# Patient Record
Sex: FEMALE | Race: BLACK OR AFRICAN AMERICAN
[De-identification: names, ages, dates, MRNs, and addresses within clinical notes are randomized per-mention and may not be internally consistent; named-entity substitution may affect disease eponyms.]

---

## 2020-06-24 ENCOUNTER — HOSPITAL ENCOUNTER (EMERGENCY)
Dept: HOSPITAL 74 - JER | Age: 20
Discharge: HOME | End: 2020-06-24
Payer: COMMERCIAL

## 2020-06-24 VITALS — TEMPERATURE: 98.7 F | SYSTOLIC BLOOD PRESSURE: 130 MMHG | DIASTOLIC BLOOD PRESSURE: 70 MMHG | HEART RATE: 74 BPM

## 2020-06-24 VITALS — BODY MASS INDEX: 27.3 KG/M2

## 2020-06-24 DIAGNOSIS — M25.551: Primary | ICD-10-CM

## 2020-06-24 PROCEDURE — 3E023GC INTRODUCTION OF OTHER THERAPEUTIC SUBSTANCE INTO MUSCLE, PERCUTANEOUS APPROACH: ICD-10-PCS

## 2020-06-24 NOTE — PDOC
History of Present Illness





- General


Chief Complaint: Pain, Acute


Stated Complaint: HIP PAIN


Time Seen by Provider: 20 22:37


History Source: Patient


Exam Limitations: No Limitations





- History of Present Illness


Initial Comments: 





20 22:47


19-year-old female no significant past medical history presents complaining of 

right hip pain since 2 PM today.  Patient states that the pain came on out of 

nowhere feels like a pulled muscle in her hip.  She states that she has had this

pain before and treated it with ibuprofen and a muscle relaxer.  She states that

this pain came on after she had a  section in October and has persisted 

since worsening today.  At that at that time she told the doctors that she had 

right hip pain, but states nothing ever came of that.  Patient presents today 

stating that she has difficulty walking secondary to the pain difficulty with 

hip flexion and extension as well as AB and adduction.  Pt otherwise denies: 

fevers, chills, syncope, lightheadedness, dizziness, headaches, neck pain, chest

pain, shortness of breath, palpitations, back pain, abdominal pain, nausea, 

vomiting, diarrhea, constipation, melena, vaginal bleeding, vaginal discharge. 

Denies DVT risk factors


20 22:53








Past History





- Medical History


Allergies/Adverse Reactions: 


                                    Allergies











Allergy/AdvReac Type Severity Reaction Status Date / Time


 


shellfish derived Allergy   Verified 20 22:36











Home Medications: 


Ambulatory Orders





Ibuprofen [Ibu] 600 mg PO TID 7 Days #21 tablet 20 


Methocarbamol [Robaxin -] 500 mg PO BID #14 tablet 20 











- Psycho-Social/Smoking History


Smoking History: Never smoked





- Substance Abuse Hx (Audit-C & DAST Scrn)


How often the patient has a drink containing alcohol: Never


Score: In Men: 4 or > Positive; In Women: 3 or > Positive: 0


Screen Result (Pos requires Nsg. Audit-10AR): Negative


In the last yr the pt used illegal drug/Rx for NonMed reason: Yes


Score:  Yes response is considered Positive: 1


Screen Result (Positive result requires Nsg. DAST-10): Positive





**Review of Systems





- Review of Systems


Constitutional: No: Chills, Fever


Respiratory: No: Cough, Shortness of Breath


Cardiac (ROS): No: Chest Pain


ABD/GI: No: Abdominal Distended, Diarrhea, Nausea, Vomiting


: No: Dysuria, Discharge


Musculoskeletal: Yes: Joint Pain (hip pain), Muscle Pain.  No: Back Pain, Joint 

Swelling, Muscle Weakness


Neurological: No: Headache, Numbness, Paresthesia





*Physical Exam





- Vital Signs


                                Last Vital Signs











Temp Pulse Resp BP Pulse Ox


 


 98.7 F   74   20   130/70   99 


 


 20 22:32  20 22:32  20 22:32  20 22:32  20 22:32














- Physical Exam





20 22:51


Gen: AAOx 3, no acute distress, comfortable, no signs of respiratory distress 


HENT: atraumatic, normocephalic with no laceration or contusion. Nasal mucosa 

without erythema. Oropharynx without erythema or exudates. Mucous membranes 

moist. 


EYES: PERRL, EOM intact, conjunctiva pink 


NECK: supple; trachea midline; no JVD, no lymphadenopathy, or thyromegaly


CV: RRR no murmurs, gallops, or rubs.


CHEST: CTA b/l no wheezing, rales or rhonchi


ABD: +BS/ND. no TTP; soft, no rebound, no guarding


EXTREMITY: no cyanosis or erythema. 2+ dorsalis pedis, posterior tibial, and 

radial pulse. No pedal edema; no calf swelling or tenderness 


SKIN: no rash, warm and dry, no diaphoresis 


HEME: no purpura or ecchymosis


NEURO: normal speech, CN II-XII intact, sensation intact, normal gait, no 

cerebellar deficits


MS: 5/5 strength in all extremities, FROM intact in all extremities. Except Righ

t hip


Right hip:  ttp over anterior hip flexor with reproducible pain on hip flexion 

extension, AB/ADDuction.  2 + femoral pulse, no signs of femoral hernia, pt 

walking with altered gait 2/2 pain


20 23:57








ED Treatment Course





- RADIOLOGY


Radiology Studies Ordered: 














 Category Date Time Status


 


 HIP & PELVIS-RIGHT [RAD] Stat Radiology  20 22:38 Ordered














Medical Decision Making





- Medical Decision Making





20 22


This is a 19-year-old female with no significant past medical history 

complaining of right hip pain most likely musculoskeletal in nature.  





All vital signs stable





Plan:


Will obtain x-ray of hip and pelvis


Toradol Flexeril for symptomatic relief


Urine hCG prior to x-ray and medicine.  


Will reassess based on results





Patient states pain has improved in the ED x-ray negative for any acute 

findings.  Robaxin and ibuprofen sent to patient's preferred pharmacy.   Patient

 was instructed not to drive or operate heavy machinery while taking Robaxin.  

The patient was also instructed not to take the medication with any other 

sedating medications and not to drink alcohol while taking the medication.





Patient is safe and stable for discharge.


Appears well ambulated in ED.


Supportive care instructions explained and given to pt.  Reasons to return 

emergently to ER explained and given. Importance of follow up with PMD and other

 specialists as indicated stressed to pt. Pt verbalized understanding of 

instructions.  Pt to follow up with PMD in 2 days.








Discharge





- Discharge Information


Problems reviewed: Yes


Clinical Impression/Diagnosis: 


 Hip pain, right





Condition: Stable


Disposition: HOME





- Admission


No





- Additional Discharge Information


Prescriptions: 


Ibuprofen [Ibu] 600 mg PO TID 7 Days #21 tablet


Methocarbamol [Robaxin -] 500 mg PO BID #14 tablet





- Follow up/Referral





- Patient Discharge Instructions


Patient Printed Discharge Instructions:  DI for Hip Pain





- Post Discharge Activity


Work/Back to School Note:  Back to Work

## 2020-09-15 ENCOUNTER — HOSPITAL ENCOUNTER (EMERGENCY)
Dept: HOSPITAL 74 - JER | Age: 20
Discharge: HOME | End: 2020-09-15
Payer: COMMERCIAL

## 2020-09-15 VITALS — TEMPERATURE: 98.2 F | DIASTOLIC BLOOD PRESSURE: 73 MMHG | HEART RATE: 100 BPM | SYSTOLIC BLOOD PRESSURE: 117 MMHG

## 2020-09-15 VITALS — BODY MASS INDEX: 25 KG/M2

## 2020-09-15 DIAGNOSIS — S93.402A: Primary | ICD-10-CM
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
## 2020-09-15 NOTE — XMS
Summarization Of Episode

                          Created on:September 15, 2020



Patient:NILES GU

Sex:Female

:2000

External Reference #:[id number]





Demographics







                          Address                   [address] [address]

 

                          Home Phone                [phone]

 

                          Mobile Phone              [phone]

 

                          Work Phone                [phone]

 

                          Email Address             [email or web address]

 

                          Preferred Language        English

 

                          Marital Status            Not  or 

 

                          Quaker Affiliation     NO

 

                          Race                      BL

 

                          Ethnic Group              Not  or 









Author







                          Organization              [facility]









Support







                Name            Relationship    Address         Phone

 

                UE, UNEMPLOYED  Unavailable     Unavailable     Unavailable

 

                UE              Unavailable     Unavailable     Unavailable

 

                UNEMPLOYD       Unavailable     Unavailable     Unavailable

 

                UNKNOWN         Unavailable     Unavailable     Unavailable

 

                COGKASSANDRA TEJADARN PARTNER         [address] [phone]



                                                [place] [address] 

 

                JOEY GRAVES SISTER          [address] [phone]



                                                [place] [address] 

 

                COGMALLORY, NATHANHORN Unavailable     Unavailable     Unavailable

 

                JOEY GRAVES Sister          [address]availa

ble



                                                [place] [address] 

 

                brina, wade  Unavailable     [address]     Unavailable



                                                [place] [address] 









Care Team Providers







                    Name                Role                Phone

 

                    MONIKA, LESLIE MD   Unavailable         Unavailable

 

                    MONIKA, LESLIE MD   Unavailable         Unavailable

 

                    MONIKA, LESLIE MD   Unavailable         Unavailable

 

                    MONIKA, LESLIE MD   Unavailable         Unavailable

 

                    MONIKA, LESLIE MD   Unavailable         Unavailable

 

                    MONIKA, LESLIE MD   Unavailable         Unavailable

 

                    MONIKA, LESLIE MD   Unavailable         Unavailable

 

                    MONIKA, LESLIE MD   Unavailable         Unavailable

 

                    MONIKA, LESLIE MD   Unavailable         Unavailable

 

                    MONIKA, LESLIE MD   Unavailable         Unavailable

 

                    MONIKA, LESLIE MD   Unavailable         Unavailable

 

                    MONIKA, LESLIE MD   Unavailable         Unavailable

 

                    MONIKA, LESLIE MD   Unavailable         Unavailable

 

                    MONIKA, LESLIE MD   Unavailable         Unavailable

 

                    MONIKA, LESLIE MD   Unavailable         Unavailable

 

                    MONIKA, LESLIE MD   Unavailable         Unavailable

 

                    MONIKA, LESLIE MD   Unavailable         Unavailable

 

                    MONIKA, LESLIE MD   Unavailable         Unavailable

 

                    MONIKA, LESLIE MD   Unavailable         Unavailable

 

                    MONIKA, LESLIE MD   Unavailable         Unavailable

 

                    MONIKA, LESLIE MD   Unavailable         Unavailable

 

                    MONIKA, LESLIE MD   Unavailable         Unavailable

 

                    MONIKA, LESLIE MD   Unavailable         Unavailable

 

                    MONIKA, LESLIE MD   Unavailable         Unavailable

 

                    MONIKA, LESLIE MD   Unavailable         Unavailable

 

                    MONIKA, LESLIE MD   Unavailable         Unavailable

 

                    LESLIE FRANK MD   Unavailable         Unavailable

 

                    MD Rod Kristin Unavailable         Unavailable

 

                    HHCCC, CNR9         Unavailable         Unavailable

 

                    Orquidea Birch Unavailable         Unavailable

 

                    Meka Kimball MD Unavailable         Unavailable

 

                    JULIET Kimball MD     Unavailable         Unavailable

 

                    JULIET Kimball MD     Unavailable         Unavailable

 

                    JULIET Kimball MD     Unavailable         Unavailable

 

                    JULIET Kimball MD     Unavailable         Unavailable

 

                    JULIET Kimball MD     Unavailable         Unavailable

 

                    STAFF,  NOT ON      Unavailable         Unavailable

 

                    Joseph             Unavailable         Unavailable

 

                    Chandan,  MD        Unavailable         Unavailable

 

                    Chandan,  MD        Unavailable         Unavailable

 

                    Chandan,  MD        Unavailable         Unavailable

 

                    Chandan,  MD        Unavailable         Unavailable

 

                    Chandan,  MD        Unavailable         Unavailable

 

                    Chandan,  MD        Unavailable         Unavailable

 

                    Chandan,  MD        Unavailable         Unavailable

 

                    Chandan,  MD        Unavailable         Unavailable

 

                    Chandan,  MD        Unavailable         Unavailable

 

                    Chandan,  MD        Unavailable         Unavailable

 

                    Chandan,  MD        Unavailable         Unavailable

 

                    Cange,  NP          Unavailable         Unavailable









Re-disclosure Warning

The records that you are about to access may contain information from federally-
assisted alcohol or drug abuse programs. If such information is present, then 
the following federally mandated warning applies: This information has been 
disclosed to you from records protected by federal confidentiality rules (42 CFR
part 2). The federal rules prohibit you from making any further disclosure of 
this information unless further disclosure is expressly permitted by the written
consent of the person to whom it pertains or as otherwise permitted by 42 CFR 
part 2. A general authorization for the release of medical or other information 
is NOT sufficient for this purpose. The Federal rules restrict any use of the 
information to criminally investigate or prosecute any alcohol or drug abuse 
patient.The records that you are about to access may contain highly sensitive 
health information, the redisclosure of which is protected by Article 27-F of 
the [facility] Public Health law. If you continue you may haveaccess to 
information: Regarding HIV / AIDS; Provided by facilities licensed or operated 
by the [facility] Office of Mental Health; or Provided by the [facility]
Office for People With Developmental Disabilities. If such information is 
present, then the following [place] mandated warning applies: This 
information has been disclosed to you from confidential records which are 
protected by state law. State law prohibits you from making any further 
disclosure of this information without the specific written consent of the 
person to whom it pertains, or as otherwise permitted by law. Any unauthorized 
further disclosure in violation of state law may result in a fine or intermediate 
sentence or both. A general authorization for the release of medical or other 
information is NOT sufficient authorization for further disclosure.



Allergies and Adverse Reactions







           Type       Description Substance  Reaction   Status     Data Source(s

)

 

                      SHELLFISH  SHELLFISH  FACE SWELLING-I            [facility]

ins



                                            [place]                    Hospital

 

           Drug allergy No Known   No Known   UNKNOWN               [facility]



                      Allergies  Allergies                        Hospital

 

                      seafood    seafood    unknown               [facility]

 

           Allergy to No Known   No known                         [facility] ([facility]

t



           substance  Allergies  allergies                        [facility]



                                 (situation)                       [facility])

 

           [id number]  SHELLFISH  Synonym(s): Idiopathic Active     [facility]

alth



                                 SHELLFISH  urticaria             System









                                        Itching in throat









           No Known Allergies No Known Allergies No known allergies             

          eCW2 (Planned



                                 (situation)                       [facility]



                                                                  [employer]

orated)

 

           No Known Allergies No Known Allergies No known allergies             

          eCW2 (Planned



                                 (situation)                       [facility]



                                                                  [employer]

orated)

 

           No Known Allergies No Known Allergies No known allergies             

          eCW2 (Planned



                                 (situation)                       [facility]



                                                                  [employer]

orated)







Encounters







           Encounter  Providers  Location   Date       Indications Data Source(s

)

 

           Outpatient Attender: CNR9 [facility]            2020            [facility] 

([facility]



                                            11:46:02 AM            [facility])









                                        Patient admitted.









           Outpatient Attender: CN9 [facility]            2020 06:31:14 AM    

        [facility] ([facility])









                                        Patient admitted.









           Outpatient Attender: CN9 [facility]            2020 02:42:34 PM    

        [facility] ([facility])









                                        Patient admitted.









           Outpatient Attender: CN9 [facility]            2019 10:12:04 AM    

        [facility] ([facility])









                                        Patient admitted.









           Outpatient Attender: Coleman            10/25/2019 12:51:00 ABDOMEN W

OUND [facility]



                      Cange NP              PM EDT                Hospital









                                        ABDOMEN WOUND









           Outpatient Attender: Coleman            10/17/2019 03:37:00 ABD WOUND

  [facility]



                      Cange NPAttender:            PM EDT                Hospita

l



                      DOCTOR STAFF                                  









                                        ABD WOUND









           Outpatient Attender: MD ICU-ANTE PAR 10/16/2019 08:56:45            M

[facility] - Richard Velasco            AM EDT                Hosp

ital









                                        Admission cancelled. Disregard status an

d admitted date.









           Inpatient  Attender: LESLIE FRANK            10/11/2019 PREGNANCY  W

jc [place]



                      MDAttender: Orquidea            03:15:00 PM EDT -         

   [facility]            10/16/2019       

     



                      [facility]            03:20:00 PM EDT            









                                        PREGNANCY

 

                                        Patient discharged.









           Outpatient Attender: Sofiya CURTIS 10/09/2019 10:11:09           

 [facility]



                      VielmanAttender: MD MUÑOZ EDT - 10/10/2019        

    [facility]            11:59:00 PM EDT           

 









                                        Patient discharged.









           Outpatient Attender: Meka CURTIS 10/04/2019 09:25:45            

[facility] [place]



                      Jigar MDAttender:            WANDA EDT - 10/04/2019       

     [facility]



                      MD Foster            01:45:00 PM EDT            



                      Rod                                   









                                        Patient discharged.









           Outpatient Attender: MD CURTIS 2019 10:58:13            CHARIS[facility]

  Richard Velasco             EDT - 2019       

     [facility]



                                            12:23:00 PM EDT            









                                        Patient discharged.









           Unlisted                         2019            [employer] ([facility]

al



           evaluation and                       12:00:00 PM EDT            [facility]

 Association



           management service                                             of [facility])

 

           Unlisted                         2019            [employer] ([facility]

al



           evaluation and                       06:50:00 PM EDT -            [facility]

[facility]



           management service                       2019            of [facility])



                                            05:45:00 PM EDT            

 

           Outpatient Attender: MD KOHLI   2019            [facility]

ad[place]



                      Bartholome PAR        01:06:17 PM EDT -            [facility]

chad Velasco             2019            



                                            11:59:00 PM EDT            









                                        Patient discharged.









           Outpatient Attender: MD KAMILLA CORDERO 06/10/2019 10:02:00            [facility] Richard Velasco             EDT - 06/10/2019       

     [facility]



                                            11:59:00 PM EDT            









                                        Patient discharged.









           [facility]          

  eCW2 ([facility]



           Richard Avendaño 12:00:00 AM EDT            [facility]

[facility]



                                                                  [employer]



                                                                  Incorporated)

 

           [facility]          

  eCW2 (Planned



           Jeevan Avendaño 12:00:00 AM EDT            [facility]

[facility]



                                                                  [facility]



                                                                  Incorporated)

 

           Outpatient<td Attender:  Jeevan Avendaño 2019            [facility] 

([facility]



           ID="encounterTypeD Anhtho     Neighborhood 05:00:00 PM EDT           

 [place]



           escriptionID0">[facility] - [facility]            N

eighborhood



           [facility]</td><td>Critical access hospital                       11:59:00 PM EDT            

[facility]



           BOB [facility]WLEY MD</td><td>[facility]</td><td></td><td></                                             



           td>                                                    

 

           [facility]          

  eCW2 (Planned



           [place]               Parachute 12:00:00 AM EST            [facility]

[facility] -



                                                                  [facility]



                                                                  Incorporated)

 

           [facility] 2018          

  eCW2 (Planned



           [place]               Parachute 12:00:00 AM EST            [facility]

[facility] -



                                                                  [facility]



                                                                  Incorporated)

 

           [facility]            [facility] 10/24[facility]2017          

  eCW2 (Planned



           [place]               Parachute 12:00:00 AM EDT            [facility]

[facility] -



                                                                  [facility]



                                                                  Incorporated)

 

           [facility] 2017          

  eCW2 (Planned



           [place]               Parachute 12:00:00 AM EDT            [facility]

[facility] -



                                                                  [facility]



                                                                  Incorporated)

 

           [facility]            [facility]          

  eCW2 (Planned



           [place]               Parachute 12:00:00 AM EDT            [facility]

[facility] -



                                                                  [facility]



                                                                  Incorporated)

 

           [facility]            [facility]          

  eCW2 (Planned



           [place]               Parachute 12:00:00 AM EDT            [facility]

[facility] -



                                                                  [facility]



                                                                  Incorporated)

 

           [facility]            [facility]          

  eCW2 (Planned



           [place]               Parachute 12:00:00 AM EDT            [facility]

[facility] -



                                                                  [facility]



                                                                  Incorporated)

 

           [facility]            [facility]          

  eCW2 (Planned



           [place]               Parachute 12:00:00 AM EDT            [facility]

[facility] -



                                                                  [facility]



                                                                  Incorporated)







Immunizations







             Vaccine      Date         Status       Description  Data Source(s)

 

             No Known Immunizations              completed                 eCW2 

([facility]



                                                                 [employer]

rated)

 

             No Known Immunizations              completed                 eCW2 

([facility]



                                                                 [employer]

rated)

 

             No Known Immunizations              completed                 eCW2 

([facility]



                                                                 [employer]

rated)







Medications







       Medication Brand  Start  Product Dose   Route  Administrative Pharmacy [facility] 

Indications         Reaction            Description         Data



          Name Date Form           Instructions Instructions                    

 Source(s)

 

     Acetaminoph Oxycod 10/16/ TABLET 1    ORAL           complet               

 [facility]



     en 325 MG / one        {Caps                ed                  [facility]



     Oxycodone Hcl/Ac 09:26:      ule}                                    Hospit

al



     Hydrochlori etamin 00 AM                                              



     de 5 MG ophen EDT                                               



     Oral Tablet                                                        



     Oxycodone                                                        



     Hcl/Acetami                                                        



     nophen                                                        

 

     Ibuprofen Ibupro 10/16/ TABLET 600  ORAL           complet                W

jc



     600 MG Oral fen  2019      mg                  ed                  [facility]



     Tablet      09:26:                                              Hospital



               00 AM                                              



               EDT                                               

 

     Ibuprofen Ibupro 10/16/ TABLET 600  ORAL           complet                W

jc



     600 MG Oral fen  2019      mg                  ed                  [facility]



     Tablet      09:26:                                              Hospital



               00 AM                                              



               EDT                                               

 

     Acetaminoph Oxycod 10/16/ TABLET 1    ORAL           complet               

 [facility]



     en 325 MG / one        {Caps                ed                  [facility]



     Oxycodone Hcl/Ac 09:26:      ule}                                    Hospit

al



     Hydrochlori etamin 00 AM                                              



     de 5 MG ophen EDT                                               



     Oral Tablet                                                        



     Oxycodone                                                        



     Hcl/Acetami                                                        



     nophen                                                        

 

     Prenatal Prenat / UNIT                     active           Prenatal G

REENWAY



     Vitamins al                                            Vitamins ([facility]



     28-0.8MG Vitami 12:00:                                              [facility]



     Oral Tablet ns   00 AM                                              [facility]

rho



          28-0.8 EDT                                               [facility]



                                                               Center)



          Oral                                                   



          Tablet                                                   

 

     Unknown                                    complet                eCW2



     Medications                                    ed                  ([facility]



                                                                 [facility]



                                                                 [facility]



                                                                 [facility]



                                                                 Incorporat



                                                                 ed)

 

     Pnv            TABLET 1    ORAL           complet                [facility]



     Cmb#21/Iron                {Caps                ed                  [facility]



     /Folic                ule}                                    Hospital



     Acid*                                                        

 

     Pnv            TABLET 1    ORAL           complet                [facility]



     Cmb#21/Iron                {Caps                ed                  [facility]



     /Folic                ule}                                    Hospital



     Acid*                                                        

 

     Etonogestre Nexpla                               active           as direct

ed eCW2



     l 68 MG non 68                                                   ([facility]



     Drug MG                                                     [facility]



     Implant                                                        - [facility]



     [Nexplanon]                                                        [facility]



     Nexplanon                                                        Incorporat



     68 MG                                                        ed)

 

     Etonogestre Nexpla                               active           as direct

ed eCW2



     l 68 MG non 68                                                   (Planned



     Drug MG                                                     [facility]



     Implant                                                        - Reed



     [Nexplanon]                                                        Shaw



     Nexplanon                                                        Incorporat



     68 MG                                                        ed)







Insurance Providers







          Payer name Policy type / Policy ID Covered   Covered party's Policy   

 Plan



                    Coverage type           party ID  relationship to Cutler    

Information



                                                  cutler              

 

          [facility] MEDICAID           [id number]                             [id number]

[id number]



          HMO                                                         

 

          SELF PAY                                SP                  



          INSURANCE                                                   

 

          [facility] Medicaid Medicaid  [id number]           1                   [id number]

[id number]

 

          [place]              [id number]           PT                  [id number]

[phone]



          [facility]  Medicaid  [id number]           1                   [id number]

[id number]



          Health Plan                                                   

 

          Medicaid            [id number]            S                   [id number]



          [id number] Regular                                                   



          Clinic Visit                                                   

 

          [facility]           [id number]           S                   [phone]



          Managed Care                                                   

 

          [facility]  Individual 0                   Self                0



          Health Plan Policy                                            







Problems, Conditions, and Diagnoses







           Code       Display Name Description Problem Type Effective  Data Sour

ce(s)



                                                       Dates      

 

             Anxiety disorder Anxiety disorder Complaint  01/10/2020 [place]

[facility]



                      (disorder) (disorder)            05:00:00 AM ([facility]

h



                                                       [facility])

 

           [id number]  Housing Problem Housing Problem ( Complaint  01/10/2020 [place]

[facility]



                      ( finding ) finding )             05:00:00 AM ([facility])

 

           [id number]  Anxiety disorder Anxiety disorder Complaint  08/15/2019 [place]

[facility]



                                                       12:00:00 PM ([facility]





                                                       EDT        [facility])

 

           [id number] Hernia Repair, Hernia Repair, Problem    2019 [facility]

[place] ([place]



                      Umbilical  Umbilical             12:00:00 AM [place]



                                                       EDT -      Neighborhood



                                                       2019 [facility] [place])



                                                       12:00:00 AM 



                                                       EDT        

 

                      Unknown Problems Unknown Problems Problem               eC

W2 (Planned



                                                                  [facility]



                                                                  [facility]



                                                                  Incorporated)

 

                      Unknown Problems Unknown Problems Problem               eC

W2 (Planned



                                                                  [facility]



                                                                  [facility]



                                                                  Incorporated)

 

                      Unknown Problems Unknown Problems Problem               eC

W2 (Planned



                                                                  [facility] -



                                                                  [facility]



                                                                  Incorporated)

 

           O69.81X0   Labor and  O69.81X0   Diagnosis  10/11/2019 [facility]



                      delivery                         07:25:00 PM Hospital



                      complicated by                       EDT        



                      cord around                                  



                      neck, without                                  



                      compression, not                                  



                      applicable or                                  



                      unspecified                                  

 

           O42.92     Full-term  O42.92     Diagnosis  10/11/2019 [facility]



                      premature                        07:25:00 PM Hospital



                      rupture of                       EDT        



                      membranes,                                  



                      unspecified as                                  



                      to length of                                  



                      time between                                  



                      rupture and                                  



                      onset of labor                                  

 

           Z37.0      Single live Z37.0      Diagnosis  10/11/2019 [facility]



                      birth                            07:25:00 PM Hospital



                                                       EDT        

 

           Z3A.38     38 weeks   Z3A.38     Diagnosis  10/11/2019 [facility]



                      gestation of                       07:25:00 PM Hospital



                      pregnancy                        EDT        

 

           O99.824    Streptococcus B O99.824    Diagnosis  10/11/2019 [facility] [place]

ins



                      carrier state                       07:25:00 PM Hospital



                      complicating                       EDT        



                      childbirth                                  

 

           O76        Abnormality in O76        Diagnosis  10/11/2019 [facility]

ns



                      fetal heart rate                       07:25:00 PM Hospita

l



                      and rhythm                       EDT        



                      complicating                                  



                      labor and                                   



                      delivery                                    

 

           O36.93X0   Maternal care Fetal and placental Diagnosis  10/10/2019 

S - New



                      for fetal  problem affecting            11:30:00 AM [place]

le



                      problem,   management of            EDT        Hospital



                      unspecified, mother in third                       



                      third trimester, trimester                        



                      not applicable                                  



                      or unspecified                                  

 

           Z3A.38     38 weeks   38 weeks gestation Diagnosis  10/10/2019 S - 

New



                      gestation of of pregnancy            11:30:00 AM [place]



                      pregnancy                        EDT        Hospital

 

           O35.8XX0   Maternal care Abnormal fetal Diagnosis  10/04/2019 S - N

ew



                      for other  echocardiography            11:31:00 AM [facility]

e



                      (suspected) affecting             EDT        Hospital



                      fetal      antepartum care of                       



                      abnormality and mother                           



                      damage, not                                  



                      applicable or                                  



                      unspecified                                  

 

           Z36.0      Encounter for Encounter for Diagnosis  10/04/2019 [facility]

w



                        chorionic villus            11:31:00 AM [facility]

e



                      screening for sampling              EDT        Hospital



                      chromosomal                                  



                      anomalies                                   

 

           Z3A.35     35 weeks   35 weeks gestation Diagnosis  2019 S - 

New



                      gestation of of pregnancy            10:05:00 AM [place]



                      pregnancy                        EDT        Hospital

 

           O35.0XX0   Maternal care Agenesis of corpus Diagnosis  06/10/2019 S

 - New



                      for (suspected) callosum of fetus,            10:02:00 AM 

[facility]



                      central nervous affecting             EDT        Hospital



                      system     antepartum care of                       



                      malformation in mother, not                       



                      fetus, not applicable or                       



                      applicable or unspeci                          



                      unspecified                                  







Surgeries/Procedures







             Procedure    Description  Date         Indications  Data Source(s)

 

             Oxygen therapy              10/12/2019                [facility]



             (procedure)               12:00:00 AM               



                                       EDT                       

 

             Oxygen therapy              10/12/2019                [facility]



             (procedure)               12:00:00 AM               



                                       EDT                       

 

             Oxygen Mask               10/11/2019                [facility]

spital



                                       12:00:00 AM               



                                       EDT                       

 

             Rr 180-240 Min. L+D              10/11/2019                [facility]



                                       12:00:00 AM               



                                       EDT                       

 

             Or 60-75 Min. L+D              10/11/2019                [facility]



                                       12:00:00 AM               



                                       EDT                       

 

             Labor Room                10/11/2019                [facility]

spital



                                       12:00:00 AM               



                                       EDT                       

 

             Resuscitation Bag Inf              10/11/2019                [facility]



                                       12:00:00 AM               



                                       EDT                       

 

             Nasal Cannula              10/11/2019                [facility]

ospital



                                       12:00:00 AM               



                                       EDT                       

 

             External fetal monitor              10/11/2019                [facility]



             surveillance              12:00:00 AM               



             (regime/therapy)              EDT                       

 

              Packs              10/11/2019                [facility]



                                       12:00:00 AM               



                                       EDT                       

 

             Bhandari Cath Kit              10/11/2019                [facility]



                                       12:00:00 AM               



                                       EDT                       

 

             Caesarean Section (Room)              10/11/2019                [facility]



                                       12:00:00 AM               



                                       EDT                       

 

             Straight urinary              10/11/2019                [facility]



             catheterization              12:00:00 AM               



                                       EDT                       

 

             Anesthesia-Epidural              10/11/2019                [facility]



                                       12:00:00 AM               



                                       EDT                       

 

             Oxygen therapy              10/11/2019                [facility]



             (procedure)               12:00:00 AM               



                                       EDT                       

 

             Oxygen Mask               10/11/2019                [facility]

spital



                                       12:00:00 AM               



                                       EDT                       

 

             Rr 180-240 Min. L+D              10/11/2019                [facility]



                                       12:00:00 AM               



                                       EDT                       

 

             Or 60-75 Min. L+D              10/11/2019                [facility]



                                       12:00:00 AM               



                                       EDT                       

 

             Labor Room                10/11/2019                [facility]

spital



                                       12:00:00 AM               



                                       EDT                       

 

             Resuscitation Bag Inf              10/11/2019                [facility]



                                       12:00:00 AM               



                                       EDT                       

 

             Nasal Cannula              10/11/2019                [facility]

ospital



                                       12:00:00 AM               



                                       EDT                       

 

             External fetal monitor              10/11/2019                [facility]



             surveillance              12:00:00 AM               



             (regime/therapy)              EDT                       

 

              Packs              10/11/2019                [facility]



                                       12:00:00 AM               



                                       EDT                       

 

             Bhandari Cath Kit              10/11/2019                [facility]



                                       12:00:00 AM               



                                       EDT                       

 

             Caesarean Section (Room)              10/11/2019                [facility]



                                       12:00:00 AM               



                                       EDT                       

 

             Straight urinary              10/11/2019                [facility]



             catheterization              12:00:00 AM               



                                       EDT                       

 

             Anesthesia-Epidural              10/11/2019                [facility]



                                       12:00:00 AM               



                                       EDT                       

 

             Oxygen therapy              10/11/2019                [facility]



             (procedure)               12:00:00 AM               



                                       EDT                       

 

             US transvaginal,              2019                eCW2 (Plann

ed



             pregnant uterus              12:00:00 AM               [facility]

 [facility]



                                       EDT                       [employer]

rated)

 

             Pregnancy Test              2019                eCW2 (Planned



                                       12:00:00 AM               [facility]

dson



                                       EDT                       [employer]

rated)

 

             HEMOGLOBIN                2019                eCW2 (Planned



                                       12:00:00 AM               [facility]

dson



                                       EDT                       [employer]

rated)

 

             GONORRHEA, SHAYY              2019                eCW2 (Planned



                                       12:00:00 AM               [facility]

dson



                                       EDT                       [employer]

rated)

 

             CHLAMYDIA, SHAYY              2019                eCW2 (Planned



                                       12:00:00 AM               [facility]

dson



                                       EDT                       [employer]

rated)

 

             LMP: 2019 LMP: 2019                [facility]AN (M

ount



                                       12:00:00 AM               [facility])

 

             GONORRHEA, SHAYY              2018                eCW2 (Planned



                                       12:00:00 AM               [facility]

dson



                                       EST                       [employer]

rated)

 

             SPECIMEN HANDLING              2018                eCW2 (Plan

candido



                                       12:00:00 AM               [facility]

dson



                                       EST                       [employer]

rated)

 

             CHLAMYDIA, SHAYY              2018                eCW2 (Planned



                                       12:00:00 AM               [facility]

dson



                                       EST                       [employer]

rated)

 

             Implanon/Nexplanon              2018                eCW2 ([facility]

nned



             Removal                   12:00:00 AM               [facility]

dson



                                       EST                       [employer]

rated)

 

             GONORRHEA, SHAYY              2018                eCW2 (Planned



                                       12:00:00 AM               [facility]

dson



                                       EST                       [employer]

rated)

 

             SPECIMEN HANDLING              2018                eCW2 (Plan

candido



                                       12:00:00 AM               [facility]

dson



                                       EST                       [employer]

rated)

 

             CHLAMYDIA, SHAYY              2018                eCW2 (Planned



                                       12:00:00 AM               [facility]

dson



                                       EST                       [employer]

rated)

 

             Implanon/Nexplanon              2018                eCW2 ([facility]

nned



             Removal                   12:00:00 AM               [facility]

dson



                                       EST                       [employer]

rated)







Results







                    ID                  Date                Data Source

 

                    [id number]      10/16/2019 01:58:56 AM EDT [facility]

[facility]











          Name      Value     Range     Interpretation Description Data      Sup

porting



                                        Code                Source(s) Document(s

)

 

          Hemoglobin 11.5                Below low normal Hemoglobin [facility] 



          [Mass/volume] in {gm/dL}                                 Health    



          Blood                                             System    

 

          Erythrocytes 3.47                Below low normal RBC Count [facility]

 



          [#/volume] in {10^6_uL                                Health    



          Blood by  }                                       System    



          Automated count                                                   

 

          Leukocytes 6.4                 Normal (applies WBC Count [facility] 



          [#/volume] in {10^3_uL            to non-numeric           Health    



          Unspecified }                   results)            System    



          specimen by                                                   



          Automated count                                                   

 

          Erythrocyte mean 99.1 fl             Above high MCV       [facility] 



          corpuscular                     normal              Health    



          volume [Entitic                                         System    



          volume] by                                                   



          Automated count                                                   

 

          Erythrocyte mean 33.4                Normal (applies MCHC      [facility]

[facility] 



          corpuscular {gm/dL}             to non-numeric           Health    



          hemoglobin                     results)            System    



          concentration                                                   



          [Mass/volume] by                                                   



          Automated count                                                   

 

          Erythrocyte 13.1 %              Normal (applies RDW-CV    [facility] 



          distribution                     to non-numeric           Health    



          width [Entitic                     results)            System    



          volume] by                                                   



          Automated count                                                   

 

          Erythrocyte mean 33.1 pg             Above high MCH       [facility] 



          corpuscular                     normal              Health    



          hemoglobin                                         System    



          [Entitic mass]                                                   



          by Automated                                                   



          count                                                       

 

          Hematocrit 34.4 %              Below low normal Hematocrit [facility] 



          [Volume                                           Health    



          Fraction] of                                         System    



          Blood                                                       

 

          Neutrophils/100 60.4 %              Normal (applies Neutrophil % [facility]

suzi 



          leukocytes in                     to non-numeric           Health    



          Blood by                      results)            System    



          Automated count                                                   

 

          Platelets 179                 Normal (applies Platelet Count [facility]

e 



          [#/volume] in {10^3_uL            to non-numeric           Health    



          Plasma by }                   results)            System    



          Automated count                                                   

 

          Nucleated 0.0                 Normal (applies NRBC %    [facility] 



          erythrocytes {/100_WB            to non-numeric           Health    



          [#/volume] in C}                  results)            System    



          Body fluid                                                   

 

          NRBC#     0.00                Normal (applies NRBC #    [facility] 



                    {10^3_uL            to non-numeric           Health    



                    }                   results)            System    

 

          Platelet mean 9.5 fl              Normal (applies MPV       [facility]

 



          volume [Entitic                     to non-numeric           Health   

 



          volume] in Blood                     results)            System    



          by Automated                                                   



          count                                                       

 

          Neutrophils 3.9                 Normal (applies Neutrophil # [facility]

e 



          [#/volume] in {10^3_uL            to non-numeric           Health    



          Body fluid }                   results)            System    

 

          Lymphocytes 32.4 %              Normal (applies Lymphocyte % [facility]

e 



          [#/volume] in                     to non-numeric           Health    



          Blood by                      results)            System    



          Automated count                                                   

 

          Monocytes 0.4                 Normal (applies Monocyte # [facility] 



          [#/volume] in {10^3_uL            to non-numeric           Health    



          Blood by Manual }                   results)            System    



          count                                                       

 

          Monocytes/100 6.0 %               Normal (applies Monocyte % [facility]

e 



          leukocytes in                     to non-numeric           Health    



          Blood                         results)            System    

 

          Lymphocyte 2.1                 Normal (applies Lymphocyte # [facility]

 



          percent   {10^3_uL            to non-numeric           Health    



          differential }                   results)            System    



          count                                                       



          (procedure)                                                   

 

          Eosinophils 0.03                Normal (applies Eosinophil # [facility]

e 



          [#/volume] in {10^3_uL            to non-numeric           Health    



          Blood     }                   results)            System    

 

          ImmatureGranuloc 0.03                Normal (applies Immature  [facility]

[facility] 



          ytes#     {10^3_uL            to non-numeric Granulocytes # [facility]    



                    }                   results)            System    

 

          ImmatureGranuloc 0.5 %               Normal (applies Immature  [facility]

[facility] 



          ytes%                         to non-numeric Granulocytes % [facility]    



                                        results)            System    

 

          Eosinophils/100 0.5 %               Normal (applies Eosinophil % [facility]

[facility] 



          leukocytes in                     to non-numeric           Health    



          Unspecified                     results)            System    



          specimen                                                    

 

          Basophils/100 0.2 %               Normal (applies Basophil % [facility]

e 



          leukocytes in                     to non-numeric           Health    



          Unspecified                     results)            System    



          specimen by                                                   



          Manual count                                                   

 

          Basophils 0.01                Normal (applies Basophil # [facility] 



          [#/volume] in {10^3_uL            to non-numeric           Health    



          Blood by  }                   results)            System    



          Automated count                                                   











                    ID                  Date                Data Source

 

                    [id number]      10/16/2019 01:58:56 AM EDT [facility]











          Name      Value     Range     Interpretation Description Data      Sup

porting



                                        Code                Source(s) Document(s

)

 

          Sodium    137                 Normal (applies Sodium, Serum [facility]

 



          [Moles/volume] in mmol/L              to non-numeric           Health 

   



          Serum or Plasma                     results)            System    

 

          Carbon dioxide, 25.4                Normal (applies CO2, Serum [facility]

[facility] 



          total     mmol/L              to non-numeric           Health    



          [Moles/volume] in                     results)            System    



          Serum or Plasma                                                   

 

          Chloride  105                 Normal (applies Chloride, [facility] 



          [Moles/volume] in mmol/L              to non-numeric Serum     Health 

   



          Serum or Plasma                     results)            System    

 

          Potassium 3.8                 Normal (applies Potassium, [facility] 



          [Mass/volume] in mmol/L              to non-numeric Serum     Health  

  



          Serum or Plasma                     results)            System    

 

          Urea nitrogen 9 mg/dl             Normal (applies Blood Urea [facility]

e 



          [Mass/volume] in                     to non-numeric Nitrogen, Health  

  



          Serum or Plasma                     results)  Serum     System    

 

          TotalProtein 6.3                 Below low normal Total Protein [facility]

[facility] 



                    mg/dl                                   [facility]    



                                                            System    

 

          Glucose   85                  Normal (applies Glucose,  [facility] 



          [Mass/volume] in mg/dL               to non-numeric Serum     Health  

  



          Serum or Plasma                     results)            System    

 

          Bilirubin.total 0.2                 Normal (applies Bilirubin, [facility]

[facility] 



          [Mass/volume] in mg/dl               to non-numeric Serum Total [facility]

    



          Serum or Plasma                     results)            System    

 

          Alkaline  180                 Above high Alkaline  [facility] 



          phosphatase {IU/L}              normal    Phosphatase, Health    



          isoenzymes                               Serum     System    



          [Enzymatic                                                   



          activity/volume]                                                   



          in Serum or Plasma                                                   



          by Heat stability                                                   

 

          DirectBilirubin 0.0                 Normal (applies Direct    [facility]

re 



                    mg/dl               to non-numeric Bilirubin Health    



                                        results)            System    

 

          Creatinine 0.81                Normal (applies Creatinine, [facility] 



          [Mass/volume] in mg/dl               to non-numeric Serum     Health  

  



          Serum or Plasma                     results)            System    

 

          Alanine   12                  Normal (applies Alanine   [facility] 



          aminotransferase {IU/L}              to non-numeric Aminotransfer Heal

th    



          [Enzymatic                     results)  ase, Serum System    



          activity/volume]                                                   



          in Serum or Plasma                                                   

 

          Aspartate 14                  Normal (applies Aspartate [facility] 



          aminotransferase {IU/L}              to non-numeric Transaminase, Heal

th    



          [Enzymatic                     results)  Serum     System    



          activity/volume]                                                   



          in Serum or Plasma                                                   



          by With P-5'-P                                                   

 

          Albumin   3.4                 Normal (applies Albumin,  [facility] 



          [Mass/volume] in {gm/dl}             to non-numeric Serum     Health  

  



          Serum or Plasma                     results)            System    

 

          I.Phosphorus 4.2                 Normal (applies I. Phosphorus [facility]

[facility] 



                    mg/dl               to non-numeric           Health    



                                        results)            System    

 

          Glomerular 89.24               Normal (applies GFR       [facility] 



          filtration                     to non-numeric           Health    



          rate/1.73 sq                     results)            System    



          M.predicted                                                   



          [Volume Rate/Area]                                                   



          in Serum or Plasma                                                   



          by                                                          



          Creatinine-based                                                   



          formula (CKD-EPI)                                                   









                                        eGFR will provide clinicians with a more

 accurate indicator of renal function 

then



                                        the serum creatinine. The eGFR is automa

tically calculated from an empiric 

formula



                                        (endorsed by the [employer]

ion) which incorporates age, sex, and



                                        race.Clinicians may notice surprisingly 

low GFR's with serum creatinine 

valueswithin



                                        normal range- particularly in elderly wo

men (with low muscle mass).In the 

hospital



                                        setting, the eGFR should add an element 

of safety in drug dosing, in assessing 

the



                                        risk of IV contrast administration, and 

in assessing vascular risk.The [facility] 

staging



                                        system is as follows:Normal:    eGFR >90

 with no kidney markersStage 1:   eGFR 

>90



                                        with kidney markers*Stage 2:   eGFR  60-

89Stage 3:   eGFR   30-59Stage 4:   eGFR



                                        15-29Stage 5:   eGFR <15 (usually requir

ing dialysis)*Markers include: 

Proteinuria,



                                        Hematuria, abnormal imaging-studies, or 

other blood or urine test abnormalities









          A/GRatio  1.17                Normal (applies [facility]G Ratio [facility] 

[facility] 



                                        non-numeric results)           System   

 

 

          Urate     4.3 mg/dl           Normal (applies to Uric Acid, [facility]

 [facility] 



          [Mass/volume] in                     non-numeric results) Serum     Sy

stem    



          Serum or Plasma                                                   

 

          Anion gap in 6.60 mmol/L           Normal (applies to Anion Gap [facility] 



          Serum or Plasma                     non-numeric results)           Sys

tem    

 

          Calcium   9.1 mg/dl           Normal (applies to Calcium, Total [facility]

[facility] 



          [Mass/volume] in                     non-numeric results) Serum     Sy

stem    



          Serum or Plasma                                                   











                    ID                  Date                Data Source

 

                    [id number]      10/16/2019 01:58:56 AM EDT [facility]

alth System











          Name      Value     Range     Interpretation Description Data Source(s

) Supporting



                                        Code                          Document(s

)

 

          Fibrinogen 391 mg/dl           Normal (applies to Fibrinogen [facility]

e 



                                        non-numeric           Health System 



                                        results)                      











                    ID                  Date                Data Source

 

                    [id number]      10/16/2019 01:58:56 AM EDT [facility]

alth System











          Name      Value     Range     Interpretation Description Data      Sup

porting



                                        Code                Source(s) Document(s

)

 

          Prothrombintim 11.10               Normal (applies Prothrombin [facility]

[facility] 



          e(PT)     {seconds            to non-numeric time (PT) Health System 



                    }                   results)                      

 

          INR in Blood 0.97                Normal (applies INR Result [facility]

 



          by Coagulation {Ratio}             to non-numeric           Health Sys

tem 



          assay                         results)                      









                                        Normal = 0.7-1.1Therapeutic = 2.0-3.0[facility]

hanical Heart = 3.0-4.5











                    ID                  Date                Data Source

 

                    [id number]      10/16/2019 01:58:56 AM EDT [facility]

alth System











          Name      Value     Range     Interpretation Description Data      Sup

porting



                                        Code                Source(s) Document(s

)

 

          aPTT in Blood 25.9                Normal (applies Activated [facility]

 



          by Coagulation {Seconds            to non-numeric Partial   Health    



          assay     }                   results)  Thromboplastin System    



                                                  Time                











                    ID                  Date                Data Source

 

                    [id number]      10/16/2019 01:58:56 AM EDT [facility]

alth System











          Name      Value     Range     Interpretation Description Data      Sup

porting



                                        Code                Source(s) Document(s

)

 

          Color     Yellow              Normal (applies Color     [facility] 



                                        to non-numeric           Health    



                                        results)            System    

 

          Appearance of HAZY                Normal (applies Urine     [facility]

 



          Urine                         to non-numeric Appearance Health    



                                        results)            System    

 

          Glucose,UA NEG                 Normal (applies Glucose, UA [facility] 



                                        to non-numeric           Health    



                                        results)            System    

 

          pH..      6.0                 Normal (applies pH..      [facility] 



                    {pH_unit            to non-numeric           Health    



                    s}                  results)            System    

 

          Specific gravity 1.020               Normal (applies Urine Specific Mo

[facility] 



          of Urine                      to non-numeric [place]   [facility]    



                                        results)            System    

 

          Protein   NEG                 Normal (applies Protein   [facility] 



          [Mass/volume] in                     to non-numeric           Health  

  



          Serum or Plasma                     results)            System    

 

          BilirubinUrine NEG                 Normal (applies Bilirubin [facility]

e 



                                        to non-numeric Urine     Health    



                                        results)            System    

 

          Urobilinogen < 2.0               Normal (applies Urobilinogen [facility]

re 



          [Mass/volume] in                     to non-numeric UA        Health  

  



          Urine                         results)            System    









                                        Reference Range: Negative or <=2.0









          Ketones   NEG                 Normal (applies Ketones UA [facility] 



          [Mass/volume] in                     to non-numeric           Health S

ystem 



          Urine                         results)                      

 

          Leukocyte esterase Large               Abnormal (applies Leukocyte Est

erase [facility] 



          [Units/volume] in                     to non-numeric Concentration [facility]

lt System 



          Urine                         results)                      

 

          Nitrate+Nitrite Negative            Normal (applies Nitrite   [facility]

re 



          [Mass/volume] in                     to non-numeric           Health S

ystem 



          Unspecified specimen                     results)                     

 

 

          Leukocytes 7 {/HPF}            Normal (applies White Blood Cells [facility]

[facility] 



          [#/volume] in                     to non-numeric           Health Syst

em 



          Unspecified specimen                     results)                     

 



          by Automated count                                                   

 

          Epithelial cells 3 {/HPF}            Normal (applies Epithelial Cells 

[facility] 



          [Presence] in                     to non-numeric           Health Syst

em 



          Unspecified specimen                     results)                     

 



          by Wet preparation                                                   

 

          Mucus     OCC                 Normal (applies Mucus     [facility] 



                                        to non-numeric           Health System 



                                        results)                      

 

          UrineBlood NEG                 Normal (applies Urine Blood [facility] 



                                        to non-numeric           Health System 



                                        results)                      

 

          RedBloodCells 1 {/HPF}            Normal (applies Red Blood Cells [facility]

[facility] 



                                        to non-numeric           Health System 



                                        results)                      











                    ID                  Date                Data Source

 

                    [id number] 10/13/2019 08:01:00 AM 

EDT [facility]











          Name      Value     Range     Interpretation Code Description Data    

  Supporting



                                                            Source(s) Document(s

)

 

          NUCLEATED RBCS 0.0 %                                   [facility] 



          (AUTO                                             Hospital  



          DIFF%)DIS                                                   











                    ID                  Date                Data Source

 

                    [id number] 10/13/2019 08:01:00 AM 

EDT [facility]











          Name      Value     Range     Interpretation Description Data      Sup

porting



                                        Code                Source(s) Document(s

)

 

          Differential AUTOMATED                               [facility] 



          cell count                                         Hospital  



          method - Blood                                                   











                    ID                  Date                Data Source

 

                    [id number] 10/13/2019 08:01:00 AM 

EDT [facility]











          Name      Value     Range     Interpretation Description Data      Sup

porting



                                        Code                Source(s) Document(s

)

 

          Immature  0.05                                    [facility] 



          granulocytes 10*3/uL                                 Hospital  



          [#/volume] in                                                   



          Blood by                                                    



          Automated count                                                   











                    ID                  Date                Data Source

 

                    [id number] 10/13/2019 08:01:00 AM 

EDT [facility]











          Name      Value     Range     Interpretation Description Data      Sup

porting



                                        Code                Source(s) Document(s

)

 

          Basophils 0.01                                    [facility] 



          [#/volume] in 10*3/uL                                 Hospital  



          Blood by                                                    



          Automated                                                   



          count                                                       











                    ID                  Date                Data Source

 

                    [id number] 10[facility]13/2019 08:01:00 [facility]











          Name      Value     Range     Interpretation Description Data      Sup

porting



                                        Code                Source(s) Document(s

)

 

          Eosinophils 0.02                                    [facility] 



          [#/volume] in 10*3/uL                                 Hospital  



          Blood by                                                    



          Automated count                                                   











                    ID                  Date                Data Source

 

                    [id number] 10/13/2019 08:01:00 AM 

EDT [facility]











          Name      Value     Range     Interpretation Description Data      Sup

porting



                                        Code                Source(s) Document(s

)

 

          Monocytes 0.41                                    [facility] 



          [#/volume] in 10*3/uL                                 Hospital  



          Blood by                                                    



          Automated                                                   



          count                                                       











                    ID                  Date                Data Source

 

                    [id number] 10/13/2019 08:01:00 AM 

EDT [facility]











          Name      Value     Range     Interpretation Description Data      Sup

porting



                                        Code                Source(s) Document(s

)

 

          Lymphocytes 2.01                                    [facility] 



          [#/volume] in 10*3/uL                                 Hospital  



          Blood by                                                    



          Automated count                                                   











                    ID                  Date                Data Source

 

                    [id number] 10/13/2019 08:01:00 AM 

EDT [facility]      Value     Range     Interpretation Description Data      Sup

porting



                                        Code                Source(s) Document(s

)

 

          Neutrophils 5.35                                    [facility] 



          [#/volume] in 10*3/uL                                 Hospital  



          Blood by                                                    



          Automated count                                                   











                    ID                  Date                Data Source

 

                    [id number] 10/13/2019 08:01:00 AM 

[facility]      Value     Range     Interpretation Description Data      Sup

porting



                                        Code                Source(s) Document(s

)

 

          Nucleated 0.0 %                                   [facility] 



          erythrocytes/10                                         Hospital  



          0 leukocytes                                                   



          [Ratio] in                                                   



          Blood by                                                    



          Automated count                                                   











                    ID                  Date                Data Source

 

                    [id number] 10/13/2019 08:01:00 AM 

[facility]      Value     Range     Interpretation Description Data      Sup

porting



                                        Code                Source(s) Document(s

)

 

          Immature  0.6 %                                   [facility] 



          granulocytes/10                                         Hospital  



          0 leukocytes in                                                   



          Blood by                                                    



          Automated count                                                   











                    ID                  Date                Data Source

 

                    [id number] 10/13/2019 08:01:00 AM 

[facility]      Value     Range     Interpretation Description Data      Sup

porting



                                        Code                Source(s) Document(s

)

 

          Basophils/100 0.1 %                                   [facility] 



          leukocytes in                                         Hospital  



          Blood by                                                    



          Automated count                                                   











                    ID                  Date                Data Source

 

                    [id number] 10/13/2019 08:01:00 AM 

[facility]      Value     Range     Interpretation Description Data      Sup

porting



                                        Code                Source(s) Document(s

)

 

          Eosinophils/100 0.3 %                                   [facility] 



          leukocytes in                                         Hospital  



          Blood by                                                    



          Automated count                                                   











                    ID                  Date                Data Source

 

                    [id number] 10/13/2019 08:01:00 AM 

[facility]      Value     Range     Interpretation Description Data      Sup

porting



                                        Code                Source(s) Document(s

)

 

          Monocytes/100 5.2 %                                   [facility] 



          leukocytes in                                         Hospital  



          Blood by                                                    



          Automated count                                                   











                    ID                  Date                Data Source

 

                    [id number] 10/13/2019 08:01:00 AM 

[facility]      Value     Range     Interpretation Description Data      Sup

porting



                                        Code                Source(s) Document(s

)

 

          Lymphocytes/10 25.6 %                                  [facility] 



          0 leukocytes                                         Hospital  



          in Blood by                                                   



          Automated                                                   



          count                                                       











                    ID                  Date                Data Source

 

                    [id number] 10/13/2019 08:01:00 AM 

[facility]      Value     Range     Interpretation Description Data      Sup

porting



                                        Code                Source(s) Document(s

)

 

          Neutrophils/10 68.2 %                                  [facility] 



          0 leukocytes                                         Hospital  



          in Blood by                                                   



          Automated                                                   



          count                                                       











                    ID                  Date                Data Source

 

                    [id number] 10/13/2019 08:01:00 AM 

[facility]      Value     Range     Interpretation Description Data      Sup

porting



                                        Code                Source(s) Document(s

)

 

          Platelet mean 9.3 fL                                  [facility] 



          volume                                            Hospital  



          [Entitic                                                    



          volume] in                                                   



          Blood by                                                    



          Automated                                                   



          count                                                       











                    ID                  Date                Data Source

 

                    [id number] 10/13/2019 08:01:00 AM 

[facility]      Value     Range     Interpretation Description Data      Sup

porting



                                        Code                Source(s) Document(s

)

 

          Platelets 194                                     [facility] 



          [#/volume] in 10*3/uL                                 Hospital  



          Blood by                                                    



          Automated                                                   



          count                                                       











                    ID                  Date                Data Source

 

                    [id number] 10/13/2019 08:01:00 AM 

[facility]      Value     Range     Interpretation Description Data      Sup

porting



                                        Code                Source(s) Document(s

)

 

          Erythrocyte 13.4 %                                  [facility] 



          distribution                                         Hospital  



          width [Ratio] by                                                   



          Automated count                                                   











                    ID                  Date                Data Source

 

                    [id number] 10/13/2019 08:01:00 AM 

[facility]      Value     Range     Interpretation Description Data      Sup

porting



                                        Code                Source(s) Document(s

)

 

          Erythrocyte mean 34.4                                    [facility] 



          corpuscular g/dL                                    Hospital  



          hemoglobin                                                   



          concentration                                                   



          [Mass/volume] by                                                   



          Automated count                                                   











                    ID                  Date                Data Source

 

                    [id number] 10/13/2019 08:01:00 AM 

[facility]











          Name      Value     Range     Interpretation Description Data      Sup

porting



                                        Code                Source(s) Document(s

)

 

          Erythrocyte 33.3 pg                                 [facility]  



          corpuscular                                                   



          hemoglobin                                                   



          [Entitic mass]                                                   



          by Automated                                                   



          count                                                       











                    ID                  Date                Data Source

 

                    [id number] 10/13/2019 08:01:00 AM 

[facility]      Value     Range     Interpretation Description Data      Sup

porting



                                        Code                Source(s) Document(s

)

 

          Erythrocyte 96.9 fL                                 [facility]                                              Hospital  



          corpuscular                                                   



          volume [Entitic                                                   



          volume] by                                                   



          Automated count                                                   











                    ID                  Date                Data Source

 

                    [id number] 10/13/2019 08:01:00 AM 

[facility]      Value     Range     Interpretation Description Data      Sup

porting



                                        Code                Source(s) Document(s

)

 

          Hematocrit 31.1 %                                  [facility] 



          [Volume                                           Hospital  



          Fraction] of                                                   



          Blood by                                                    



          Automated                                                   



          count                                                       











                    ID                  Date                Data Source

 

                    [id number] 10/13/2019 08:01:00 AM 

[facility]      Value     Range     Interpretation Description Data      Sup

porting



                                        Code                Source(s) Document(s

)

 

          Hemoglobin 10.7 g/dL                               [facility] 



          [Mass/volume]                                         Hospital  



          in Blood                                                    











                    ID                  Date                Data Source

 

                    [id number] 10/13/2019 08:01:00 AM 

[facility]      Value     Range     Interpretation Description Data      Sup

porting



                                        Code                Source(s) Document(s

)

 

          Erythrocytes 3.21                                    [facility] 



          [#/volume] in 10*6/uL                                 Hospital  



          Blood by                                                    



          Automated count                                                   











                    ID                  Date                Data Source

 

                    [id number] 10/13/2019 08:01:00 AM 

[facility]      Value     Range     Interpretation Description Data      Sup

porting



                                        Code                Source(s) Document(s

)

 

          Leukocytes 7.9                                     [facility] 



          [#/volume] in 10*3/uL                                 Hospital  



          Blood by                                                    



          Automated                                                   



          count                                                       











                    ID                  Date                Data Source

 

                    [id number] 10/13/2019 08:01:00 AM 

[facility]











          Name      Value     Range     Interpretation Code Description Data    

  Supporting



                                                            Source(s) Document(s

)

 

          NUCLEATED RBCS 0.0 %                                   [facility] 



          (AUTO                                             Hospital  



          DIFF%)DIS                                                   











                    ID                  Date                Data Source

 

                    [id number] 10/13/2019 08:01:00 AM 

[facility]











          Name      Value     Range     Interpretation Description Data      Sup

porting



                                        Code                Source(s) Document(s

)

 

          Differential AUTOMATED                               [facility] 



          cell count                                         [facility]  



          method - Blood                                                   











                    ID                  Date                Data Source

 

                    [id number] 10/13/2019 08:01:00 AM 

[facility]











          Name      Value     Range     Interpretation Description Data      Sup

porting



                                        Code                Source(s) Document(s

)

 

          Immature  0.05                                    [facility] 



          granulocytes 10*3/uL                                 Hospital  



          [#/volume] in                                                   



          Blood by                                                    



          Automated count                                                   











                    ID                  Date                Data Source

 

                    [id number] 10/13/2019 08:01:00 AM 

[facility]











          Name      Value     Range     Interpretation Description Data      Sup

porting



                                        Code                Source(s) Document(s

)

 

          Basophils 0.01                                    [facility] 



          [#/volume] in 10*3/uL                                 Hospital  



          Blood by                                                    



          Automated                                                   



          count                                                       











                    ID                  Date                Data Source

 

                    [id number] 10/13/2019 08:01:00 AM 

[facility]











          Name      Value     Range     Interpretation Description Data      Sup

porting



                                        Code                Source(s) Document(s

)

 

          Eosinophils 0.02                                    [facility] 



          [#/volume] in 10*3/uL                                 Hospital  



          Blood by                                                    



          Automated count                                                   











                    ID                  Date                Data Source

 

                    [id number] 10/13/2019 08:01:00 AM 

[facility]











          Name      Value     Range     Interpretation Description Data      Sup

porting



                                        Code                Source(s) Document(s

)

 

          Monocytes 0.41                                    [facility] 



          [#/volume] in 10*3/uL                                 Hospital  



          Blood by                                                    



          Automated                                                   



          count                                                       











                    ID                  Date                Data Source

 

                    [id number] 10/13/2019 08:01:00 AM 

[facility]      Value     Range     Interpretation Description Data      Sup

porting



                                        Code                Source(s) Document(s

)

 

          Lymphocytes 2.01                                    [facility] 



          [#/volume] in 10*3/uL                                 Hospital  



          Blood by                                                    



          Automated count                                                   











                    ID                  Date                Data Source

 

                    [id number] 10/13/2019 08:01:00 AM 

[facility]      Value     Range     Interpretation Description Data      Sup

porting



                                        Code                Source(s) Document(s

)

 

          Neutrophils 5.35                                    [facility] 



          [#/volume] in 10*3/uL                                 Hospital  



          Blood by                                                    



          Automated count                                                   











                    ID                  Date                Data Source

 

                    [id number] 10/13/2019 08:01:00 AM 

[facility]      Value     Range     Interpretation Description Data      Sup

porting



                                        Code                Source(s) Document(s

)

 

          Nucleated 0.0 %                                   [facility] 



          erythrocytes/10                                         Hospital  



          0 leukocytes                                                   



          [Ratio] in                                                   



          Blood by                                                    



          Automated count                                                   











                    ID                  Date                Data Source

 

                    [id number] 10/13/2019 08:01:00 AM 

[facility]      Value     Range     Interpretation Description Data      Sup

porting



                                        Code                Source(s) Document(s

)

 

          Immature  0.6 %                                   [facility] 



          granulocytes/10                                         Hospital  



          0 leukocytes in                                                   



          Blood by                                                    



          Automated count                                                   











                    ID                  Date                Data Source

 

                    [id number] 10/13/2019 08:01:00 AM 

[facility]      Value     Range     Interpretation Description Data      Sup

porting



                                        Code                Source(s) Document(s

)

 

          Basophils/100 0.1 %                                   [facility] 



          leukocytes in                                         Hospital  



          Blood by                                                    



          Automated count                                                   











                    ID                  Date                Data Source

 

                    [id number] 10/13/2019 08:01:00 AM 

[facility]      Value     Range     Interpretation Description Data      Sup

porting



                                        Code                Source(s) Document(s

)

 

          Eosinophils/100 0.3 %                                   [facility] 



          leukocytes in                                         Hospital  



          Blood by                                                    



          Automated count                                                   











                    ID                  Date                Data Source

 

                    [id number] 10/13/2019 08:01:00 AM 

[facility] 

[facility]











          Name      Value     Range     Interpretation Description Data      Sup

porting



                                        Code                Source(s) Document(s

)

 

          Monocytes/100 5.2 %                                   [facility] 



          leukocytes in                                         Hospital  



          Blood by                                                    



          Automated count                                                   











                    ID                  Date                Data Source

 

                    [id number] 10/13/2019 08:01:00 AM 

[facility]











          Name      Value     Range     Interpretation Description Data      Sup

porting



                                        Code                Source(s) Document(s

)

 

          Lymphocytes/10 25.6 %                                  [facility] 



          0 leukocytes                                         Hospital  



          in Blood by                                                   



          Automated                                                   



          count                                                       











                    ID                  Date                Data Source

 

                    [id number] 10/13/2019 08:01:00 AM 

[facility]











          Name      Value     Range     Interpretation Description Data      Sup

porting



                                        Code                Source(s) Document(s

)

 

          Neutrophils/10 68.2 %                                  [facility] 



          0 leukocytes                                         Hospital  



          in Blood by                                                   



          Automated                                                   



          count                                                       











                    ID                  Date                Data Source

 

                    [id number] 10/13/2019 08:01:00 AM 

[facility]











          Name      Value     Range     Interpretation Description Data      Sup

porting



                                        Code                Source(s) Document(s

)

 

          Platelet mean 9.3 fL                                  [facility] 



          volume                                            Hospital  



          [Entitic                                                    



          volume] in                                                   



          Blood by                                                    



          Automated                                                   



          count                                                       











                    ID                  Date                Data Source

 

                    [id number] 10/13/2019 08:01:00 AM 

[facility]      Value     Range     Interpretation Description Data      Sup

porting



                                        Code                Source(s) Document(s

)

 

          Platelets 194                                     [facility] 



          [#/volume] in 10*3/uL                                 Hospital  



          Blood by                                                    



          Automated                                                   



          count                                                       











                    ID                  Date                Data Source

 

                    [id number] 10/13/2019 08:01:00 AM 

[facility]      [facility]     Range     Interpretation Description Data      Sup

porting



                                        Code                Source(s) Document(s

)

 

          Erythrocyte 13.4 %                                  [facility] 



          distribution                                         Hospital  



          width [Ratio] by                                                   



          Automated count                                                   











                    ID                  Date                Data Source

 

                    [id number] 10/13/2019 08:01:00 AM 

EDT [facility]











          Name      Value     Range     Interpretation Description Data      Sup

porting



                                        Code                Source(s) Document(s

)

 

          Erythrocyte mean 34.4                                    [facility] 



          corpuscular g/dL                                    Hospital  



          hemoglobin                                                   



          concentration                                                   



          [Mass/volume] by                                                   



          Automated count                                                   











                    ID                  Date                Data Source

 

                    [id number] 10/13/2019 08:01:00 AM 

[facility]      Value     Range     Interpretation Description Data      Sup

porting



                                        Code                Source(s) Document(s

)

 

          Erythrocyte 33.3 pg                                 [facility]  



          corpuscular                                                   



          hemoglobin                                                   



          [Entitic mass]                                                   



          by Automated                                                   



          count                                                       











                    ID                  Date                Data Source

 

                    [id number] 10/13/2019 08:01:00 AM 

[facility]      Value     Range     Interpretation Description Data      Sup

porting



                                        Code                Source(s) Document(s

)

 

          Erythrocyte 96.9 fL                                 [facility]  



          corpuscular                                                   



          volume [Entitic                                                   



          volume] by                                                   



          Automated count                                                   











                    ID                  Date                Data Source

 

                    [id number] 10/13/2019 08:01:00 AM 

[facility]      Value     Range     Interpretation Description Data      Sup

porting



                                        Code                Source(s) Document(s

)

 

          Hematocrit 31.1 %                                  [facility] 



          [Volume                                           Hospital  



          Fraction] of                                                   



          Blood by                                                    



          Automated                                                   



          count                                                       











                    ID                  Date                Data Source

 

                    [id number] 10/13/2019 08:01:00 AM 

[facility]      Value     Range     Interpretation Description Data      Sup

porting



                                        Code                Source(s) Document(s

)

 

          Hemoglobin 10.7 g/dL                               [facility] 



          [Mass/volume]                                         Hospital  



          in Blood                                                    











                    ID                  Date                Data Source

 

                    [id number] 10/13/2019 08:01:00 AM 

[facility]      Value     Range     Interpretation Description Data      Sup

porting



                                        Code                Source(s) Document(s

)

 

          Erythrocytes 3.21                                    [facility] 



          [#/volume] in 10*6/uL                                 Hospital  



          Blood by                                                    



          Automated count                                                   











                    ID                  Date                Data Source

 

                    [id number] 10/13/2019 08:01:00 AM 

[facility]      Value     Range     Interpretation Description Data      Sup

porting



                                        Code                Source(s) Document(s

)

 

          Leukocytes 7.9                                     [facility] 



          [#/volume] in 10*3/uL                                 Hospital  



          Blood by                                                    



          Automated                                                   



          count                                                       











                    ID                  Date                Data Source

 

                    [id number] 10/12/2019 10:20:00 AM 

[facility]      Value     Range     Interpretation Code Description Data [facility]

rce(s) Supporting



                                                                      Document(s

)

 

          READ BACK N/A Cords                               [facility]  











                    ID                  Date                Data Source

 

                    [id number] 10/12/2019 10:20:00 AM 

[facility]      Value     Range     Interpretation Code Description Data [facility]

rce(s) Supporting



                                                                      Document(s

)

 

          NOTE WHO  N/A                                     [facility]  











                    ID                  Date                Data Source

 

                    [id number] 10/12/2019 10:20:00 AM 

[facility]      Value     Range     Interpretation Code Description Data    

  Supporting



                                                            Source(s) Document(s

)

 

          METHEMOGLOBIN 2.4 %                                   [facility]  











                    ID                  Date                Data Source

 

                    [id number] 10/12/2019 10:20:00 AM 

[facility]      Value     Range     Interpretation Description Data      Sup

porting



                                        Code                Source(s) Document(s

)

 

          CARBOXYHEMOGLOBIN 0.3 %                                   [facility]  











                    ID                  Date                Data Source

 

                    [id number] 10/12/2019 10:20:00 AM 

[facility]      Value     Range     Interpretation Code Description Data [facility]

rce(s) Supporting



                                                                      Document(s

)

 

          ABG TEMP  98.8                                    [facility]  











                    ID                  Date                Data Source

 

                    [id number] 10/12/2019 10:20:00 AM 

[facility]      Value     Range     Interpretation Code Description Data [facility]

rce(s) Supporting



                                                                      Document(s

)

 

          FIO2      21 %                                    [facility]  











                    ID                  Date                Data Source

 

                    [id number] 10/12/2019 10:20:00 AM 

[facility]      Value     Range     Interpretation Code Description Data [facility]

rce(s) Supporting



                                                                      Document(s

)

 

          ABG BE    -2.8 mmol/L                               [facility]  











                    ID                  Date                Data Source

 

                    [id number] 10/12/2019 10:20:00 AM 

[facility]      Value     Range     Interpretation Code Description Data [facility]

rce(s) Supporting



                                                                      Document(s

)

 

          ABG O2SAT 23 %                                    [facility]  











                    ID                  Date                Data Source

 

                    [id number] 10/12/2019 10:20:00 AM 

[facility]      Value     Range     Interpretation Code Description Data [facility]

rce(s) Supporting



                                                                      Document(s

)

 

          ABG HCO3  24 mmol/L                               [facility]  











                    ID                  Date                Data Source

 

                    [id number] 10/12/2019 10:20:00 AM 

[facility]      Value     Range     Interpretation Code Description Data [facility]

rce(s) Supporting



                                                                      Document(s

)

 

          ABG PO2   14 mm[Hg]                               [facility]  











                    ID                  Date                Data Source

 

                    [id number] 10/12/2019 10:20:00 AM 

[facility]      Value     Range     Interpretation Code Description Data [facility]

rce(s) Supporting



                                                                      Document(s

)

 

          ABG PCO2  51 mm[Hg]                               [facility]  











                    ID                  Date                Data Source

 

                    [id number] 10/12/2019 10:20:00 AM 

[facility]      Value     Range     Interpretation Code Description Data [facility]

rce(s) Supporting



                                                                      Document(s

)

 

          ABG PH    7.29                                    [facility]  











                    ID                  Date                Data Source

 

                    [id number] 10/12/2019 10:20:00 AM 

[facility]      Value     Range     Interpretation Code Description Data [facility]

rce(s) Supporting



                                                                      Document(s

)

 

          ABG MODE  Room Air                                [facility]  











                    ID                  Date                Data Source

 

                    [id number] 10/12/2019 10:20:00 AM 

[facility]      Value     Range     Interpretation Code Description Data    

  Supporting



                                                            Source(s) Document(s

)

 

          ABG SITE  Cord-                                   [facility] 



                    Venous                                  Hospital  











                    ID                  Date                Data Source

 

                    [id number] 10/12/2019 10:20:00 AM 

EDT [facility]











          Name      Value     Range     Interpretation Code Description Data [facility]

rce(s) Supporting



                                                                      Document(s

)

 

          BLAS TEST POSITIVE                                [facility]  











                    ID                  Date                Data Source

 

                    [id number] 10/12/2019 10:20:00 AM 

EDT [facility]











          Name      Value     Range     Interpretation Code Description Data [facility]

rce(s) Supporting



                                                                      Document(s

)

 

          READ BACK N/A Cords                               [facility]  











                    ID                  Date                Data Source

 

                    [id number] 10/12/2019 10:20:00 AM 

[facility]      Value     Range     Interpretation Code Description Data [facility]

rce(s) Supporting



                                                                      Document(s

)

 

          NOTE WHO  N/A                                     [facility]  











                    ID                  Date                Data Source

 

                    [id number] 10/12/2019 10:20:00 AM 

[facility]      Value     Range     Interpretation Code Description Data    

  Supporting



                                                            Source(s) Document(s

)

 

          METHEMOGLOBIN 2.4 %                                   [facility]  











                    ID                  Date                Data Source

 

                    [id number] 10/12/2019 10:20:00 AM 

[facility]      Value     Range     Interpretation Description Data      Sup

porting



                                        Code                Source(s) Document(s

)

 

          CARBOXYHEMOGLOBIN 0.3 %                                   [facility]  











                    ID                  Date                Data Source

 

                    [id number] 10/12/2019 10:20:00 AM 

[facility]      Value     Range     Interpretation Code Description Data [facility]

rce(s) Supporting



                                                                      Document(s

)

 

          ABG TEMP  98.8                                    [facility]  











                    ID                  Date                Data Source

 

                    [id number] 10/12/2019 10:20:00 AM 

[facility]      Value     Range     Interpretation Code Description Data [facility]

rce(s) Supporting



                                                                      Document(s

)

 

          FIO2      21 %                                    [facility]  











                    ID                  Date                Data Source

 

                    [id number] 10/12/2019 10:20:00 AM 

[facility]      Value     Range     Interpretation Code Description Data [facility]

rce(s) Supporting



                                                                      Document(s

)

 

          ABG BE    -2.8 mmol/L                               [facility]  











                    ID                  Date                Data Source

 

                    [id number] 10/12/2019 10:20:00 AM 

EDT [facility]











          Name      Value     Range     Interpretation Code Description Data [facility]

rce(s) Supporting



                                                                      Document(s

)

 

          ABG O2SAT 23 %                                    [facility]  











                    ID                  Date                Data Source

 

                    [id number] 10/12/2019 10:20:00 AM 

[facility]      Value     Range     Interpretation Code Description Data [facility]

rce(s) Supporting



                                                                      Document(s

)

 

          ABG HCO3  24 mmol/L                               [facility]  











                    ID                  Date                Data Source

 

                    [id number] 10/12/2019 10:20:00 AM 

[facility]      Value     Range     Interpretation Code Description Data [facility]

rce(s) Supporting



                                                                      Document(s

)

 

          ABG PO2   14 mm[Hg]                               [facility]  











                    ID                  Date                Data Source

 

                    [id number] 10/12/2019 10:20:00 AM 

[facility]      Value     Range     Interpretation Code Description Data [facility]

rce(s) Supporting



                                                                      Document(s

)

 

          ABG PCO2  51 mm[Hg]                               [facility]  











                    ID                  Date                Data Source

 

                    [id number] 10/12/2019 10:20:00 AM 

[facility]      Value     Range     Interpretation Code Description Data [facility]

rce(s) Supporting



                                                                      Document(s

)

 

          ABG PH    7.29                                    [facility]  











                    ID                  Date                Data Source

 

                    [id number] 10/12/2019 10:20:00 AM 

[facility]      Value     Range     Interpretation Code Description Data [facility]

rce(s) Supporting



                                                                      Document(s

)

 

          ABG MODE  Room Air                                [facility]  











                    ID                  Date                Data Source

 

                    [id number] 10/12/2019 10:20:00 AM 

EDT [facility]











          Name      Value     Range     Interpretation Code Description Data    

  Supporting



                                                            Source(s) Document(s

)

 

          ABG SITE  Cord-                                   [place] 



                    Venous                                  [facility]  











                    ID                  Date                Data Source

 

                    [id number] 10/12/2019 10:20:00 AM 

EDT [facility]











          Name      Value     Range     Interpretation Code Description Data [facility]

rce(s) Supporting



                                                                      Document(s

)

 

          BLAS TEST POSITIVE                                [facility]  











                    ID                  Date                Data Source

 

                    [id number] 10/11/2019 03:41:00 PM 

EDT [facility]











          Name      Value     Range     Interpretation Description Data      Sup

porting



                                        Code                Source(s) Document(s

)

 

          Treponema NON-REACT                               [facility] 



          pallidum  [facility]  



          IgG+IgM Ab                                                   



          [Presence] in                                                   



          Serum                                                       











                    ID                  Date                Data Source

 

                    [id number] 10/11/2019 03:41:00 PM 

EDT [facility]











          Name      Value     Range     Interpretation Description Data      Sup

porting



                                        Code                Source(s) Document(s

)

 

          Hepatitis B NON-REACT                               [place] 



          virus surface [facility]  



          Ag [Presence]                                                   



          in Serum                                                    











                    ID                  Date                Data Source

 

                    [id number] 10/11/2019 03:41:00 PM 

EDT [facility]











          Name      Value     Range     Interpretation Description Data      Sup

porting



                                        Code                Source(s) Document(s

)

 

          Urate     4.0 mg/dL                               [facility] 



          [Mass/volum                                         Hospital  



          e] in Serum                                                   



          or Plasma                                                   











                    ID                  Date                Data Source

 

                    [id number] 10/11/2019 03:41:00 PM 

[facility]











          Name      Value     Range     Interpretation Description Data      Sup

porting



                                        Code                Source(s) Document(s

)

 

          Lactate   225 U/L                                 [facility]  



          [Enzymatic                                                   



          activity/volume]                                                   



          in Serum or                                                   



          Plasma                                                      











                    ID                  Date                Data Source

 

                    [id number] 10/11/2019 03:41:00 PM 

EDT [facility]











          Name      Value     Range     Interpretation Description Data      Sup

porting



                                        Code                Source(s) Document(s

)

 

          Aspartate 25 U/L                                  [facility]     



          aminotransferase                                         [place]    



          [Enzymatic                                         Hospital  



          activity/volume] in                                                   



          Serum or Plasma                                                   











                    ID                  Date                Data Source

 

                    [id number] 10/11/2019 03:41:00 PM 

EDT [facility]











          Name      Value     Range     Interpretation Description Data      Sup

porting



                                        Code                Source(s) Document(s

)

 

          Alanine   15 U/L                                  [facility]     



          aminotransferase                                         [place]    



          [Enzymatic                                         Hospital  



          activity/volume] in                                                   



          Serum or Plasma                                                   











                    ID                  Date                Data Source

 

                    [id number] 10/11/2019 03:41:00 PM 

[facility]











          Name      Value     Range     Interpretation Description Data      Sup

porting



                                        Code                Source(s) Document(s

)

 

          Alkaline  186 U/L                                 [place] 



          phosphatase                                         Hospital  



          [Enzymatic                                                   



          activity/volume                                                   



          ] in Serum or                                                   



          Plasma                                                      











                    ID                  Date                Data Source

 

                    [id number] 10/11/2019 03:41:00 PM 

[facility]











          Name      Value     Range     Interpretation Description Data      Sup

porting



                                        Code                Source(s) Document(s

)

 

          Bilirubin.t 0.2 mg/dL                               [facility]  



          [Mass/volum                                                   



          e] in Serum                                                   



          or Plasma                                                   











                    ID                  Date                Data Source

 

                    [id number] 10/11/2019 03:41:00 PM 

[facility]











          Name      Value     Range     Interpretation Code Description Data [facility]

rce(s) Supporting



                                                                      Document(s

)

 

          [facility]Glob 1.3                                     [facility]                                         [facility]  



          Ratio] in                                                   



          Serum or                                                    



          Plasma                                                      











                    ID                  Date                Data Source

 

                    [id number] 10/11/2019 03:41:00 PM 

[facility]











          Name      Value     Range     Interpretation Description Data      Sup

porting



                                        Code                Source(s) Document(s

)

 

          Albumin   3.9 g/dL                                [facility] 



          [Mass/volume                                         Hospital  



          ] in Serum                                                   



          or Plasma                                                   











                    ID                  Date                Data Source

 

                    [id number] 10/11/2019 03:41:00 PM 

[facility]











          Name      Value     Range     Interpretation Description Data      Sup

porting



                                        Code                Source(s) Document(s

)

 

          Protein   6.8 g/dL                                [facility] 



          [Mass/volume                                         Hospital  



          ] in Serum                                                   



          or Plasma                                                   











                    ID                  Date                Data Source

 

                    [id number] 10/11/2019 03:41:00 PM 

[facility]











          Name      Value     Range     Interpretation Description Data      Sup

porting



                                        Code                Source(s) Document(s

)

 

          Calcium   9.2 mg/dL                               [facility] 



          [Mass/volume                                         Hospital  



          ] in Serum                                                   



          or Plasma                                                   











                    ID                  Date                Data Source

 

                    [id number] 10/11/2019 03:41:00 PM 

[facility]











          Name      Value     Range     Interpretation Code Description Data [facility]

rce(s) Supporting



                                                                      Document(s

)

 

          Urea      16.7                                    [facility] 



          nitrogen/Cre                                         Hospital  



          atinine                                                     



          [Mass Ratio]                                                   



          in Serum or                                                   



          Plasma                                                      











                    ID                  Date                Data Source

 

                    [id number] 10/11/2019 03:41:00 PM 

[facility]











          Name      Value     Range     Interpretation Description Data      Sup

porting



                                        Code                Source(s) Document(s

)

 

          Creatinine 0.6 mg/dL                               [facility] 



          [Mass/volume]                                         Hospital  



          in Serum or                                                   



          Plasma                                                      











                    ID                  Date                Data Source

 

                    [id number] 10/11/2019 03:41:00 PM 

[facility]











          Name      Value     Range     Interpretation Description Data      Sup

porting



                                        Code                Source(s) Document(s

)

 

          Urea      10 mg/dL                                [facility] 



          nitrogen                                          Hospital  



          [Mass/volume                                                   



          ] in Serum                                                   



          or Plasma                                                   











                    ID                  Date                Data Source

 

                    [id number] 10/11/2019 03:41:00 PM 

[facility]











          Name      Value     Range     Interpretation Code Description Data [facility]

rce(s) Supporting



                                                                      Document(s

)

 

          Anion gap in 13                                      [facility] 



          Serum or                                          [facility]  



          Plasma                                                      











                    ID                  Date                Data Source

 

                    [id number] 10/11/2019 03:41:00 PM 

[facility]











          Name      Value     Range     Interpretation Description Data      Sup

porting



                                        Code                Source(s) Document(s

)

 

          Carbon    23 mmol/L                               [facility] 



          dioxide,                                          Hospital  



          total                                                       



          [Moles/volu                                                   



          me] in                                                      



          Serum or                                                    



          Plasma                                                      











                    ID                  Date                Data Source

 

                    [id number] 10/11/2019 03:41:00 PM 

[facility]











          Name      Value     Range     Interpretation Description Data      Sup

porting



                                        Code                Source(s) Document(s

)

 

          Chloride  103                                     [facility] 



          [Moles/volum mmol/L                                  Hospital  



          e] in Serum                                                   



          or Plasma                                                   











                    ID                  Date                Data Source

 

                    [id number] 10/11/2019 03:41:00 PM 

[facility]











          Name      Value     Range     Interpretation Description Data      Sup

porting



                                        Code                Source(s) Document(s

)

 

          Potassium 3.8                                     [facility] 



          [Moles/volume mmol/L                                  Hospital  



          ] in Serum or                                                   



          Plasma                                                      











                    ID                  Date                Data Source

 

                    [id number] 10/11/2019 03:41:00 PM 

[facility]











          Name      Value     Range     Interpretation Description Data      Sup

porting



                                        Code                Source(s) Document(s

)

 

          Sodium    135 mmol/L                               [facility] 



          [Moles/volu                                         Hospital  



          me] in                                                      



          Serum or                                                    



          Plasma                                                      











                    ID                  Date                Data Source

 

                    [id number] 10/11/2019 03:41:00 PM 

[facility]











          Name      Value     Range     Interpretation Description Data      Sup

porting



                                        Code                Source(s) Document(s

)

 

          Glucose   112 mg/dL                               [facility] 



          [Mass/volume                                         Hospital  



          ] in Serum                                                   



          or Plasma                                                   











                    ID                  Date                Data Source

 

                    [id number] 10/11/2019 03:41:00 PM 

[facility]











          Name      Value     Range     Interpretation Description Data      Sup

porting



                                        Code                Source(s) Document(s

)

 

          Treponema NON-REACT                               [facility] 



          pallidum  [facility]  



          IgG+IgM Ab                                                   



          [Presence] in                                                   



          Serum                                                       











                    ID                  Date                Data Source

 

                    [id number] 10/11/2019 03:41:00 PM 

[facility]











          Name      Value     Range     Interpretation Description Data      Sup

porting



                                        Code                Source(s) Document(s

)

 

          Hepatitis B NON-REACT                               [facility] 



          virus surface [facility]                                     Hospital  



          Ag [Presence]                                                   



          in Serum                                                    











                    ID                  Date                Data Source

 

                    [id number] 10/11/2019 03:41:00 PM 

[facility]











          Name      Value     Range     Interpretation Description Data      Sup

porting



                                        Code                Source(s) Document(s

)

 

          Urate     4.0 mg/dL                               [facility] 



          [Mass/volum                                         Hospital  



          e] in Serum                                                   



          or Plasma                                                   











                    ID                  Date                Data Source

 

                    [id number] 10/11/2019 03:41:00 PM 

[facility]











          Name      Value     Range     Interpretation Description Data      Sup

porting



                                        Code                Source(s) Document(s

)

 

          Lactate   225 U/L                                 [facility] 



          dehydrogenase                                         Hospital  



          [Enzymatic                                                   



          activity/volume]                                                   



          in Serum or                                                   



          Plasma                                                      











                    ID                  Date                Data Source

 

                    [id number] 10/11/2019 03:41:00 PM 

[facility]











          Name      Value     Range     Interpretation Description Data      Sup

porting



                                        Code                Source(s) Document(s

)

 

          Aspartate 25 U/L                                  [facility]     



          aminotransferase                                         [place]    



          [Enzymatic                                         Hospital  



          activity/volume] in                                                   



          Serum or Plasma                                                   











                    ID                  Date                Data Source

 

                    [id number] 10/11/2019 03:41:00 PM 

[facility]











          Name      Value     Range     Interpretation Description Data      Sup

porting



                                        Code                Source(s) Document(s

)

 

          Alanine   15 U/L                                  [facility]     



          aminotransferase                                         [facility]    



          [Enzymatic                                         Hospital  



          activity/volume] in                                                   



          Serum or Plasma                                                   











                    ID                  Date                Data Source

 

                    [id number] 10/11/2019 03:41:00 PM 

[facility]











          Name      Value     Range     Interpretation Description Data      Sup

porting



                                        Code                Source(s) Document(s

)

 

          Alkaline  186 U/L                                 [facility] 



          phosphatase                                         Hospital  



          [Enzymatic                                                   



          activity/volume                                                   



          ] in Serum or                                                   



          Plasma                                                      











                    ID                  Date                Data Source

 

                    [id number] 10/11/2019 03:41:00 PM 

[facility]











          Name      Value     Range     Interpretation Description Data      Sup

porting



                                        Code                Source(s) Document(s

)

 

          Bilirubin.t 0.2 mg/dL                               [facility]  



          [Mass/volum                                                   



          e] in Serum                                                   



          or Plasma                                                   











                    ID                  Date                Data Source

 

                    [id number] 10/11/2019 03:41:00 PM 

[facility]











          Name      Value     Range     Interpretation Code Description Data [facility]

rce(s) Supporting



                                                                      Document(s

)

 

          Albumin/Glob 1.3                                     [facility] 



          ulin [Mass                                         Hospital  



          Ratio] in                                                   



          Serum or                                                    



          Plasma                                                      











                    ID                  Date                Data Source

 

                    [id number] 10/11/2019 03:41:00 PM 

[facility]











          Name      Value     Range     Interpretation Description Data      Sup

porting



                                        Code                Source(s) Document(s

)

 

          Albumin   3.9 g/dL                                [facility] 



          [Mass/volume                                         Hospital  



          ] in Serum                                                   



          or Plasma                                                   











                    ID                  Date                Data Source

 

                    [id number] 10/11/2019 03:41:00 PM 

[facility]











          Name      Value     Range     Interpretation Description Data      Sup

porting



                                        Code                Source(s) Document(s

)

 

          Protein   6.8 g/dL                                [facility] 



          [Mass/volume                                         Hospital  



          ] in Serum                                                   



          or Plasma                                                   











                    ID                  Date                Data Source

 

                    [id number] 10/11/2019 03:41:00 PM 

[facility]











          Name      Value     Range     Interpretation Description Data      Sup

porting



                                        Code                Source(s) Document(s

)

 

          Calcium   9.2 mg/dL                               [facility] 



          [Mass/volume                                         Hospital  



          ] in Serum                                                   



          or Plasma                                                   











                    ID                  Date                Data Source

 

                    [id number] 10/11/2019 03:41:00 PM 

[facility]











          Name      Value     Range     Interpretation Code Description Data [facility]

rce(s) Supporting



                                                                      Document(s

)

 

          Urea      16.7                                    [facility] 



          nitrogen/Cre                                         Hospital  



          atinine                                                     



          [Mass Ratio]                                                   



          in Serum or                                                   



          Plasma                                                      











                    ID                  Date                Data Source

 

                    [id number] 10/11/2019 03:41:00 PM 

[facility]











          Name      Value     Range     Interpretation Description Data      Sup

porting



                                        Code                Source(s) Document(s

)

 

          Creatinine 0.6 mg/dL                               [facility] 



          [Mass/volume]                                         Hospital  



          in Serum or                                                   



          Plasma                                                      











                    ID                  Date                Data Source

 

                    [id number] 10/11/2019 03:41:00 PM 

[facility]











          Name      Value     Range     Interpretation Description Data      Sup

porting



                                        Code                Source(s) Document(s

)

 

          Urea      10 mg/dL                                [facility] 



          nitrogen                                          Hospital  



          [Mass/volume                                                   



          ] in Serum                                                   



          or Plasma                                                   











                    ID                  Date                Data Source

 

                    [id number] 10/11/2019 03:41:00 PM 

[facility]











          Name      Value     Range     Interpretation Code Description Data [facility]

rce(s) Supporting



                                                                      Document(s

)

 

          Anion gap in 13                                      [facility] 



          Serum or                                          Hospital  



          Plasma                                                      











                    ID                  Date                Data Source

 

                    [id number] 10/11/2019 03:41:00 PM 

[facility]











          Name      Value     Range     Interpretation Description Data      Sup

porting



                                        Code                Source(s) Document(s

)

 

          Carbon    23 mmol/L                               [facility] 



          dioxide,                                          Hospital  



          total                                                       



          [Moles/volu                                                   



          me] in                                                      



          Serum or                                                    



          Plasma                                                      











                    ID                  Date                Data Source

 

                    [id number] 10/11/2019 03:41:00 PM 

[facility]











          Name      Value     Range     Interpretation Description Data      Sup

porting



                                        Code                Source(s) Document(s

)

 

          Chloride  103                                     [facility] 



          [Moles/volum mmol/L                                  Hospital  



          e] in Serum                                                   



          or Plasma                                                   











                    ID                  Date                Data Source

 

                    [id number] 10/11/2019 03:41:00 PM 

[facility]











          Name      Value     Range     Interpretation Description Data      Sup

porting



                                        Code                Source(s) Document(s

)

 

          Potassium 3.8                                     [facility] 



          [Moles/volume mmol/L                                  Hospital  



          ] in Serum or                                                   



          Plasma                                                      











                    ID                  Date                Data Source

 

                    [id number] 10/11/2019 03:41:00 PM 

EDT [facility]











          Name      Value     Range     Interpretation Description Data      Sup

porting



                                        Code                Source(s) Document(s

)

 

          Sodium    135 mmol/L                               [facility] 



          [Moles/volu                                         Hospital  



          me] in                                                      



          Serum or                                                    



          Plasma                                                      











                    ID                  Date                Data Source

 

                    [id number] 10/11/2019 03:41:00 PM 

EDT [facility]











          Name      Value     Range     Interpretation Description Data      Sup

porting



                                        Code                Source(s) Document(s

)

 

          Glucose   112 mg/dL                               [facility] 



          [Mass/volume                                         Hospital  



          ] in Serum                                                   



          or Plasma                                                   











                    ID                  Date                Data Source

 

                    US transvaginal, pregnant 2019 06:33:18 PM eCW2 ([facility]

[facility]



                    uterus ([id number]    EDT                 [facility]

[facility])











          Name      Value     Range     Interpretation Description Data      Sup

porting



                                        Code                Source(s) Document(s

)

 

          Laboratory                               US        eCW2 ([facility] 



          studies (set)                               transvaginal, [facility]

 



                                                  pregnant  [facility]    



                                                  uterus ([id number]) [facility]   



                                                            Incorporated) 











                    ID                  Date                Data Source

 

                    [id number] 2019 06:35:37 PM EDT OLLIE NUNEZ ([place]



                    [id number]                             [facility])











          Name      Value     Range     Interpretation Description Data Source(s

) Supporting



                                        Code                          Document(s

)

 

          No Results No Results                     No Results SEAN ([facility] 



                                                  Recorded For [place]    



                                                  Specified [facility]) 











                    ID                  Date                Data Source

 

                    Gonorrhea, SHAYY      2018 12:00:00 AM EST eCW2 ([facility]

 [facility]



                                                            [facility]corpor[facility])











          Name      Value     Range     Interpretation Description Data Source(s

) Supporting



                                        Code                          Document(s

)

 

                    Negative  Negative            Neisseria eCW2 ([facility] 



                                                  gonorrhoeae, [facility] 



                                                  SHAYY       [facility] 



                                                            Incorpor[facility]) 











                    ID                  Date                Data Source

 

                    Chlamydia, SHAYY      2018 12:00:00 AM EST eCW2 ([facility]

 [facility]



                                                            [facility])











          Name      Value     Range     Interpretation Description Data Source(s

) Supporting



                                        Code                          Document(s

)

 

                    Negative  Negative            Chlamydia eCW2 ([facility] 



                                                  trachomatis, [facility] 



                                                  SHAYY       [facility] 



                                                            Incorporated) 









                                        Procedure

 

                                        







Social History







           Code       Duration   Value      Status     Description Data Source(s

)

 

           Smoking    10/11/2019 Never smoked completed  Never smoked [facility]

ns



                      03:46:00 PM EDT tobacco               tobacco (finding) [facility]

spital



                                 (finding)                        

 

           Smoking    10/11/2019 Never smoked completed  Never smoked [facility]

ns



                      03:46:00 PM EDT tobacco               tobacco (finding) [facility]

spital



                                 (finding)                        

 

           Smoking               Never Smoker completed  Never Smoker eCW2 ([facility]

[facility]



                                                                  [facility]

[facility]



                                                                  [facility]



                                                                  [facility])

 

           Smoking               Unknown if completed  Unknown if ever SEAN 

([facility]



                                 ever smoked            smoked     [facility])

 

           Smoking               Unknown if completed  Unknown if ever eCW2 ([facility]

nned



                                 [facility] smoked            [facility]oked     [facility]

 

           Smoking               Unknown if completed  Unknown if ever eCW2 ([facility]

nned



                                 ever smoked            smoked     [facility])







Vital Signs







                    ID                  Date                Data Source

 

                    UNK                                     











           Name       Value      Range      Interpretation Code Description Data

 Source(s)

 

           Diastolic blood 66 mm[Hg]                        66 mm[Hg]  [facility]



           pressure                                               Hospital

 

           Systolic blood 115 mm[Hg]                       115 mm[Hg] [facility]

ns



           pressure                                               Hospital

 

           Respiratory rate 18 /min                          18 /min    [facility]

 

           Heart rate 72 /min                          72 /min    [facility]

 

           Body temperature 36.10656                         36.86808 Vero [facility]

 

           Body temperature 98.1                             98.1 [degF] [facility]

lains



                      [degF]                                      [facility]

 

           Diastolic blood 66 mm[Hg]                        66 mm[Hg]  [facility]



           pressure                                               Hospital

 

           Systolic blood 115 mm[Hg]                       115 mm[Hg] [facility]

ns



           pressure                                               Hospital

 

           Respiratory rate 18 /min                          18 /min    [facility]

 

           Heart rate 72 /min                          72 /min    [facility]

 

           Body temperature 36.97967                         36.93410 Vero [facility]

 

           Body temperature 98.1                             98.1 [degF] [facility]

lains



                      [degF]                                      [facility]

 

           Body mass index 30.0 kg/m2                       30.0 kg/m2 [facility]

ins



           (BMI) [Ratio]                                             Hospital

 

           Body weight 194                              194 [lb_av] [facility]



                      [lb_av]                                     [facility]

 

           Body mass index 30.0 kg/m2                       30.0 kg/m2 [facility]

ins



           (BMI) [Ratio]                                             Hospital

 

           Body weight 194                              194 [lb_av] [facility]



                      [lb_av]                                     Hospital

 

           Body temperature 98.4       0 - 200    Normal (applies to 98.4 [degF]

 [facility]



                      [degF]                non-numeric            System



                                            results)              

 

           Body temperature 36.8 Vero   0 - 99.9   Normal (applies to 36.8 Vero   

[facility]



                                            non-numeric            System



                                            results)              

 

           Diastolic blood 75 mm[Hg]  0 - 999    Normal (applies to 75 mm[Hg]  [facility]



           pressure                         non-numeric            System



                                            results)              

 

           Systolic blood 129 mm[Hg] 0 - 999    Normal (applies to 129 mm[Hg] [facility]



           pressure                         non-numeric            System



                                            results)              

 

           Respiratory rate 16         0 - 999    Normal (applies to 16         

[facility] [employer]



                                            non-numeric            System



                                            results)              

 

           Heart rate 83         0 - 999    Normal (applies to 83         [facility] [employer]



                                            non-numeric            System



                                            results)              

 

           Body temperature 98.2       0 - 200    Normal (applies to 98.2 [degF]

 [facility]



                      [degF]                non-numeric            System



                                            results)              

 

           Body temperature 36.7 Vero   0 - 99.9   Normal (applies to 36.7 Vero   

[facility] [employer]



                                            non-numeric            System



                                            results)              

 

           Diastolic blood 59 mm[Hg]  0 - 999    Below low normal 59 mm[Hg]  [facility]



           pressure                                               System

 

           Systolic blood 107 mm[Hg] 0 - 999    Below low normal 107 mm[Hg] [facility] [employer]



           pressure                                               System

 

           Respiratory rate 15         0 - 999    Normal (applies to 15         

[facility] [employer]



                                            non-numeric            System



                                            results)              

 

           Heart rate 68         0 - 999    Normal (applies to 68         [facility] [employer]



                                            non-numeric            System



                                            results)              

 

           Body temperature 98.2       0 - 200    Normal (applies to 98.2 [degF]

 [facility] [employer]



                      [degF]                non-numeric            System



                                            results)              

 

           Body temperature 36.7 Vero   0 - 99.9   Normal (applies to 36.7 Vero   

[facility] [employer]



                                            non-numeric            System



                                            results)              

 

           Diastolic blood 68 mm[Hg]  0 - 999    Normal (applies to 68 mm[Hg]  

[facility]



           pressure                         non-numeric            System



                                            results)              

 

           Systolic blood 122 mm[Hg] 0 - 999    Normal (applies to 122 mm[Hg] [facility]



           pressure                         non-numeric            System



                                            results)              

 

           Respiratory rate 16         0 - 999    Normal (applies to 16         

[facility] [employer]



                                            non-numeric            System



                                            results)              

 

           Heart rate 88         0 - 999    Normal (applies to 88         [facility]

[employer]



                                            non-numeric            System



                                            results)              

 

           Diastolic blood 83 mm[Hg]                        83 mm[Hg]  eCW2 ([facility]

nned



           pressure                                               [facility]



                                                                  [place])

 

           Systolic blood 127 mm[Hg]                       127 mm[Hg] eCW2 (Plan

[facility]



           pressure                                               [facility]



                                                                  [place])

 

           Body mass index 20.68                            20.68 kg/m2 eCW2 (Pl

anned



           (BMI) [Ratio] kg/m2                                       [facility] 

-



                                                                  [place])

 

           Body weight 136                              136 [lb_av] eCW2 ([facility]

d



           Measured   [lb_av]                                     [facility]



                                                                  [facility]



                                                                  [facility]corporated)

 

           Body height 68 [in_us]                       68 [in_us] eCW2 ([facility]



                                                                  [facility]



                                                                  [facility]



                                                                  Incorporated)

 

           PhenX - pain, 0                                0          SEAN ([facility]



           abdominal - type                                             [place]



           [facility]









                                        18yrs old patient here for pregnacy test

. LMP 2019.









           Body weight 142 [lb_av]                       142 [lb_av] SEAN ([facility])









                                        18yrs old patient here for pregnacy test

. LMP 2019.









           Body temperature 98.3 [degF]                       98.3 [degF] [facility] ([facility])









                                        18yrs old patient here for pregnacy test

. LMP 2019.









           Respiratory rate 23 /min                          23 /min    [place]

 ([facility])









                                        18yrs old patient here for pregnacy test

. LMP 2019.









           Heart rate 111 /min                         111 /min   [place] ([facility]

[facility])









                                        18yrs old patient here for pregnacy test

. LMP 2019.









           Diastolic blood pressure 79 mm[Hg]                        79 mm[Hg]  

[place] ([facility])









                                        18yrs old patient here for pregnacy test

. LMP 2019.









           Systolic blood pressure 127 mm[Hg]                       127 mm[Hg] G

[place] ([facility])









                                        18yrs old patient here for pregnacy test

. LMP 2019.









           Diastolic blood pressure 74 mm[Hg]                        74 mm[Hg]  

eCW2 ([facility]



                                                                  [facility])

 

           Systolic blood pressure 118 mm[Hg]                       118 mm[Hg] e

CW2 ([facility]



                                                                  [facility])

 

           Body mass index (BMI) 24.29 kg/m2                       24.29 kg/m2 e

CW2 ([facility]



           [Ratio]                                                [facility]

 [facility])

 

           Body weight Measured 159.8 [lb_av]                       159.8 [lb_av

] eCW2 ([facility]



                                                                  [facility]

 [facility])

 

           Body height 68 [in_us]                       68 [in_us] eCW2 ([facility]

 [facility]



                                                                  [facility]

 [facility])

 

           Diastolic blood pressure 74 mm[Hg]                        74 mm[Hg]  

eCW2 ([facility]



                                                                  [facility]

 [facility])

 

           Systolic blood pressure 118 mm[Hg]                       118 mm[Hg] e

CW2 ([facility]



                                                                  [facility]

 [facility])

 

           Body mass index (BMI) 24.29 kg/m2                       24.29 kg/m2 e

CW2 ([facility]



           [Ratio]                                                [facility]

 [facility])

 

           Body weight Measured 159.8 [lb_av]                       159.8 [lb_av

] eCW2 ([facility]



                                                                  [facility]

 [employer])

 

           Body height 68 [in_us]                       68 [in_us] eCW2 ([facility]

 [facility]



                                                                  [facility]

 [facility])







Patient Treatment Plan of Care







             Planned Activity Planned Date Details      Description  Data Source

(s)

 

             Prenatal Vitamins 2019 12:00:00                           TEVIN Jones-0.8MG Oral Tablet AM [facility])

## 2021-02-21 ENCOUNTER — HOSPITAL ENCOUNTER (EMERGENCY)
Dept: HOSPITAL 74 - JER | Age: 21
LOS: 1 days | Discharge: HOME | End: 2021-02-22
Payer: COMMERCIAL

## 2021-02-21 VITALS — BODY MASS INDEX: 28.1 KG/M2

## 2021-02-21 DIAGNOSIS — R11.2: Primary | ICD-10-CM

## 2021-02-21 LAB
ALBUMIN SERPL-MCNC: 4.4 G/DL (ref 3.4–5)
ALP SERPL-CCNC: 95 U/L (ref 45–117)
ALT SERPL-CCNC: 61 U/L (ref 13–61)
ANION GAP SERPL CALC-SCNC: 13 MMOL/L (ref 8–16)
AST SERPL-CCNC: 30 U/L (ref 15–37)
BASOPHILS # BLD: 0.3 % (ref 0–2)
BILIRUB SERPL-MCNC: 0.5 MG/DL (ref 0.2–1)
BUN SERPL-MCNC: 11.4 MG/DL (ref 7–18)
CALCIUM SERPL-MCNC: 9.6 MG/DL (ref 8.5–10.1)
CHLORIDE SERPL-SCNC: 107 MMOL/L (ref 98–107)
CO2 SERPL-SCNC: 21 MMOL/L (ref 21–32)
CREAT SERPL-MCNC: 0.8 MG/DL (ref 0.55–1.3)
DEPRECATED RDW RBC AUTO: 13.3 % (ref 11.6–15.6)
EOSINOPHIL # BLD: 0.2 % (ref 0–4.5)
GLUCOSE SERPL-MCNC: 98 MG/DL (ref 74–106)
HCT VFR BLD CALC: 38.8 % (ref 32.4–45.2)
HGB BLD-MCNC: 13.4 GM/DL (ref 10.7–15.3)
LIPASE SERPL-CCNC: 70 U/L (ref 73–393)
LYMPHOCYTES # BLD: 23.1 % (ref 8–40)
MCH RBC QN AUTO: 33 PG (ref 25.7–33.7)
MCHC RBC AUTO-ENTMCNC: 34.6 G/DL (ref 32–36)
MCV RBC: 95.5 FL (ref 80–96)
MONOCYTES # BLD AUTO: 5.8 % (ref 3.8–10.2)
NEUTROPHILS # BLD: 70.6 % (ref 42.8–82.8)
PLATELET # BLD AUTO: 228 K/MM3 (ref 134–434)
PMV BLD: 8.8 FL (ref 7.5–11.1)
POTASSIUM SERPLBLD-SCNC: 3.2 MMOL/L (ref 3.5–5.1)
PROT SERPL-MCNC: 8.3 G/DL (ref 6.4–8.2)
RBC # BLD AUTO: 4.06 M/MM3 (ref 3.6–5.2)
SODIUM SERPL-SCNC: 141 MMOL/L (ref 136–145)
WBC # BLD AUTO: 9.7 K/MM3 (ref 4–10)

## 2021-02-21 PROCEDURE — 3E033GC INTRODUCTION OF OTHER THERAPEUTIC SUBSTANCE INTO PERIPHERAL VEIN, PERCUTANEOUS APPROACH: ICD-10-PCS

## 2021-02-22 VITALS — TEMPERATURE: 98.5 F | SYSTOLIC BLOOD PRESSURE: 118 MMHG | HEART RATE: 54 BPM | DIASTOLIC BLOOD PRESSURE: 83 MMHG

## 2021-02-22 LAB
APPEARANCE UR: (no result)
BACTERIA # UR AUTO: 1302 /UL (ref 0–1359)
BILIRUB UR STRIP.AUTO-MCNC: NEGATIVE MG/DL
CASTS URNS QL MICRO: 17 /UL (ref 0–3.1)
COLOR UR: YELLOW
EPITH CASTS URNS QL MICRO: >36 /UL (ref 0–25.1)
KETONES UR QL STRIP: (no result)
LEUKOCYTE ESTERASE UR QL STRIP.AUTO: NEGATIVE
NITRITE UR QL STRIP: NEGATIVE
PH UR: 6 [PH] (ref 5–8)
PROT UR QL STRIP: (no result)
PROT UR QL STRIP: NEGATIVE
SP GR UR: 1.04 (ref 1.01–1.03)
UROBILINOGEN UR STRIP-MCNC: 1 MG/DL (ref 0.2–1)